# Patient Record
Sex: FEMALE | Race: WHITE | NOT HISPANIC OR LATINO | Employment: FULL TIME | URBAN - METROPOLITAN AREA
[De-identification: names, ages, dates, MRNs, and addresses within clinical notes are randomized per-mention and may not be internally consistent; named-entity substitution may affect disease eponyms.]

---

## 2022-05-19 ENCOUNTER — OFFICE VISIT (OUTPATIENT)
Dept: FAMILY MEDICINE | Facility: CLINIC | Age: 22
End: 2022-05-19
Payer: COMMERCIAL

## 2022-05-19 VITALS
BODY MASS INDEX: 25.76 KG/M2 | OXYGEN SATURATION: 99 % | HEIGHT: 62 IN | SYSTOLIC BLOOD PRESSURE: 118 MMHG | TEMPERATURE: 98.3 F | WEIGHT: 140 LBS | HEART RATE: 80 BPM | DIASTOLIC BLOOD PRESSURE: 90 MMHG | RESPIRATION RATE: 15 BRPM

## 2022-05-19 DIAGNOSIS — R00.2 PALPITATIONS: Primary | ICD-10-CM

## 2022-05-19 PROCEDURE — 3008F BODY MASS INDEX DOCD: CPT | Performed by: STUDENT IN AN ORGANIZED HEALTH CARE EDUCATION/TRAINING PROGRAM

## 2022-05-19 PROCEDURE — 99203 OFFICE O/P NEW LOW 30 MIN: CPT | Performed by: STUDENT IN AN ORGANIZED HEALTH CARE EDUCATION/TRAINING PROGRAM

## 2022-05-19 RX ORDER — DROSPIRENONE AND ETHINYL ESTRADIOL 0.02-3(28)
KIT ORAL
COMMUNITY
Start: 2022-05-16 | End: 2023-03-24 | Stop reason: ALTCHOICE

## 2022-05-19 NOTE — PROGRESS NOTES
"Subjective      Patient ID: Taran Jernigan is a 21 y.o. female.  2000      HPI     Patient presents for palpitations.  Happens while lying down at times.  Happened on most days last week and then a few days ago, but not since then.  Each episode is 5-10 seconds. Asymptomatic when working out.  No chest pain or shortness of breath during episodes.  Just starting summer semester, but does not feel overall stressed otherwise.  HR was initially higher than usual when episodes started last week, but has normalized this week.  Has cut out coffee and alcohol for the past two weeks in case that was the cause.  Used to drink coffee 3 times a week, sometimes 2 cups.  Now drinks matcha.  Has had anxiety in the past, but this feels different.  Was also having some indigestion and heart burn yesterday.  Had labs through [facility] on 5/10/22 - CBC, CMP and TSH all normal  Had abnormal TSH in 2019.    The following have been reviewed and updated as appropriate in this visit:   Allergies  Meds  Problems         Review of Systems reviewed and as noted in the HPI.    Objective     Vitals:    05/19/22 0831   BP: 118/90   BP Location: Left upper arm   Patient Position: Sitting   Pulse: 80   Resp: 15   Temp: 36.8 °C (98.3 °F)   TempSrc: Temporal   SpO2: 99%   Weight: 63.5 kg (140 lb)   Height: 1.575 m (5' 2\")     Body mass index is 25.61 kg/m².    Physical Exam  Vitals reviewed.   HENT:      Head: Normocephalic and atraumatic.   Eyes:      Extraocular Movements: Extraocular movements intact.   Cardiovascular:      Rate and Rhythm: Normal rate and regular rhythm.   Pulmonary:      Effort: Pulmonary effort is normal.      Breath sounds: Normal breath sounds.   Musculoskeletal:      Right lower leg: No edema.      Left lower leg: No edema.   Skin:     General: Skin is warm and dry.   Neurological:      General: No focal deficit present.      Mental Status: She is alert and oriented to person, place, and time.   Psychiatric:  "        Mood and Affect: Mood normal.         Assessment/Plan   Diagnoses and all orders for this visit:    Palpitations (Primary)  Resolving   Recent TSH, CBC w/diff and CMP all normal last week  Continue off coffee and alcohol as tolerated  Manage stress  Holter monitor ordered in the case that palpitations worsen again - patient can keep a symptom diary  -     Holter monitor - 24 hour; Future      Ev Metzger MD  5/19/2022

## 2022-05-23 ENCOUNTER — HOSPITAL ENCOUNTER (OUTPATIENT)
Dept: CARDIOLOGY | Facility: HOSPITAL | Age: 22
Discharge: HOME | End: 2022-05-23
Attending: STUDENT IN AN ORGANIZED HEALTH CARE EDUCATION/TRAINING PROGRAM
Payer: COMMERCIAL

## 2022-05-23 DIAGNOSIS — R00.2 PALPITATIONS: ICD-10-CM

## 2022-05-23 PROCEDURE — 93225 XTRNL ECG REC<48 HRS REC: CPT

## 2022-05-26 PROCEDURE — 93227 XTRNL ECG REC<48 HR R&I: CPT | Performed by: STUDENT IN AN ORGANIZED HEALTH CARE EDUCATION/TRAINING PROGRAM

## 2022-07-15 ENCOUNTER — OFFICE VISIT (OUTPATIENT)
Dept: FAMILY MEDICINE | Facility: CLINIC | Age: 22
End: 2022-07-15
Payer: COMMERCIAL

## 2022-07-15 VITALS
BODY MASS INDEX: 25.4 KG/M2 | HEART RATE: 84 BPM | HEIGHT: 62 IN | OXYGEN SATURATION: 99 % | WEIGHT: 138 LBS | RESPIRATION RATE: 16 BRPM | TEMPERATURE: 97.3 F

## 2022-07-15 DIAGNOSIS — F41.1 GAD (GENERALIZED ANXIETY DISORDER): Primary | ICD-10-CM

## 2022-07-15 DIAGNOSIS — K21.9 GASTROESOPHAGEAL REFLUX DISEASE WITHOUT ESOPHAGITIS: ICD-10-CM

## 2022-07-15 PROCEDURE — 99214 OFFICE O/P EST MOD 30 MIN: CPT | Performed by: STUDENT IN AN ORGANIZED HEALTH CARE EDUCATION/TRAINING PROGRAM

## 2022-07-15 PROCEDURE — 3008F BODY MASS INDEX DOCD: CPT | Performed by: STUDENT IN AN ORGANIZED HEALTH CARE EDUCATION/TRAINING PROGRAM

## 2022-07-15 NOTE — PATIENT INSTRUCTIONS
Continue lexapro  Consider a therapist.  Unisom for sleep  Passional flower and valerian root - sleep aids (melatonin-free)    Pepcid 20 mg twice daily for at least a week to see if it helps  Start a probiotic  Drink 1-2 liters of water daily and eat fiber (metamucil)  Chantal, peppermint (IBGard, peppermint oil capsule)

## 2022-07-15 NOTE — ASSESSMENT & PLAN NOTE
Associated with stress and alternating diarrhea and constipation  Pepcid 20 mg twice daily for at least a week  Start a probiotic  Drink 1-2 liters of water daily and eat fiber (metamucil)  Chantal, peppermint (IBGard, peppermint oil capsule)  [facility] diet

## 2022-07-15 NOTE — PROGRESS NOTES
"Subjective      Patient ID: Taran Jernigan is a 21 y.o. female.  2000      HPI     Patient presents for anxiety and GI upset.  Was started on lexapro by [employer] a few days ago.  No side effects.  Was on lexapro during first year of college; has been off for a few years but anxiety is returning.  Therapist: working on finding one  Sleep: working on it, melatonin made her wake up in the middle of the night    Also having heartburn and acid reflux over the past week.  Has happened in the past.  TUMS and pepcid were not helpful, although she took pepcid as needed only.  No dietary triggers, although has been avoiding spicy food and other potential triggers.  Notes some sharp transient LLQ pain.   No melena or hematochezia.  Related to stress per patient.  Has a history of self-induced vomiting in high school, worried if there is any GI sequelae.    The following have been reviewed and updated as appropriate in this visit:   Allergies  Meds  Problems         Review of Systems reviewed and as noted in the HPI.    Objective     Vitals:    07/15/22 1323   Pulse: 84   Resp: 16   Temp: 36.3 °C (97.3 °F)   TempSrc: Temporal   SpO2: 99%   Weight: 62.6 kg (138 lb)   Height: 1.562 m (5' 1.5\")     Body mass index is 25.65 kg/m².    Physical Exam  Vitals reviewed.   HENT:      Head: Normocephalic and atraumatic.   Eyes:      Extraocular Movements: Extraocular movements intact.   Cardiovascular:      Rate and Rhythm: Normal rate and regular rhythm.   Pulmonary:      Effort: Pulmonary effort is normal.      Breath sounds: Normal breath sounds.   Abdominal:      General: Bowel sounds are normal. There is no distension.      Palpations: Abdomen is soft.      Comments: Mild TTP of LLQ   Musculoskeletal:      Right lower leg: No edema.      Left lower leg: No edema.   Skin:     General: Skin is warm and dry.   Neurological:      General: No focal deficit present.      Mental Status: She is alert and oriented to " person, place, and time.   Psychiatric:         Mood and Affect: Mood normal.         Assessment/Plan   Diagnoses and all orders for this visit:    RAMANDEEP (generalized anxiety disorder) (Primary)  Assessment & Plan:  Continue lexapro - has follow up appt with prescriber in about a month  Start therapy      Gastroesophageal reflux disease without esophagitis  Assessment & Plan:  Associated with stress and alternating diarrhea and constipation  Pepcid 20 mg twice daily for at least a week  Start a probiotic  Drink 1-2 liters of water daily and eat fiber (metamucil)  Chantal, peppermint (IBGard, peppermint oil capsule)  [place] diet          Ev Metzger MD  7/15/2022

## 2022-09-02 ENCOUNTER — TRANSCRIBE ORDERS (OUTPATIENT)
Dept: SCHEDULING | Age: 22
End: 2022-09-02

## 2022-09-02 DIAGNOSIS — K63.9 DISEASE OF INTESTINE, UNSPECIFIED: Primary | ICD-10-CM

## 2022-09-02 DIAGNOSIS — K21.9 GASTRO-ESOPHAGEAL REFLUX DISEASE WITHOUT ESOPHAGITIS: ICD-10-CM

## 2022-09-14 ENCOUNTER — HOSPITAL ENCOUNTER (OUTPATIENT)
Dept: RADIOLOGY | Facility: HOSPITAL | Age: 22
Discharge: HOME | End: 2022-09-14
Payer: COMMERCIAL

## 2022-09-14 DIAGNOSIS — K21.9 GASTRO-ESOPHAGEAL REFLUX DISEASE WITHOUT ESOPHAGITIS: ICD-10-CM

## 2022-09-14 DIAGNOSIS — K63.9 DISEASE OF INTESTINE, UNSPECIFIED: ICD-10-CM

## 2022-09-14 PROCEDURE — 74246 X-RAY XM UPR GI TRC 2CNTRST: CPT

## 2023-03-24 ENCOUNTER — OFFICE VISIT (OUTPATIENT)
Dept: PRIMARY CARE | Facility: CLINIC | Age: 23
End: 2023-03-24
Payer: COMMERCIAL

## 2023-03-24 VITALS
BODY MASS INDEX: 27.6 KG/M2 | OXYGEN SATURATION: 99 % | RESPIRATION RATE: 15 BRPM | HEIGHT: 62 IN | DIASTOLIC BLOOD PRESSURE: 74 MMHG | HEART RATE: 92 BPM | WEIGHT: 150 LBS | TEMPERATURE: 97.7 F | SYSTOLIC BLOOD PRESSURE: 120 MMHG

## 2023-03-24 DIAGNOSIS — E66.3 OVERWEIGHT: ICD-10-CM

## 2023-03-24 DIAGNOSIS — Z00.00 ENCOUNTER FOR PREVENTIVE CARE: Primary | ICD-10-CM

## 2023-03-24 DIAGNOSIS — L30.9 DERMATITIS: ICD-10-CM

## 2023-03-24 PROCEDURE — 99385 PREV VISIT NEW AGE 18-39: CPT | Performed by: INTERNAL MEDICINE

## 2023-03-24 PROCEDURE — 3008F BODY MASS INDEX DOCD: CPT | Performed by: INTERNAL MEDICINE

## 2023-03-24 RX ORDER — MOMETASONE FUROATE 1 MG/G
CREAM TOPICAL DAILY
Qty: 45 G | Refills: 1 | Status: SHIPPED | OUTPATIENT
Start: 2023-03-24 | End: 2023-05-15

## 2023-03-24 ASSESSMENT — PAIN SCALES - GENERAL: PAINLEVEL: 0-NO PAIN

## 2023-03-24 ASSESSMENT — PATIENT HEALTH QUESTIONNAIRE - PHQ9: SUM OF ALL RESPONSES TO PHQ9 QUESTIONS 1 & 2: 0

## 2023-03-24 NOTE — PROGRESS NOTES
"    Subjective      Patient ID: Taran Jernigan is a 22 y.o. female.  2000      New pt visit 22 y old     Pt would like to get updated labs.    BMI 27.8 diet and exercise doing ok.  Hard to fit in exercise time.     GYN: Sees Dr Patton seen around the summer believes she had  PAP smear that was normal.  Recently stopped OCP to consider IUD.  Had regular menses on OCP no hx of normal PAP smear      Immunization status:  tdap up to date unaware of dates.  COVID 19 up to date unaware of dates   Flu up to date unaware of dates   HPV completed series   Completed hep b series       Has eczematous rash on hands otc meds has not helped       The following have been reviewed and updated as appropriate in this visit:   Tobacco  Allergies  Meds  Problems  Med Hx  Surg Hx  Fam Hx  Soc   Hx      Review of Systems   Skin: Positive for rash.   All other systems reviewed and are negative.      Objective     Vitals:    03/24/23 1421   BP: 120/74   BP Location: Right upper arm   Patient Position: Sitting   Pulse: 92   Resp: 15   Temp: 36.5 °C (97.7 °F)   TempSrc: Temporal   SpO2: 99%   Weight: 68 kg (150 lb)   Height: 1.562 m (5' 1.5\")     Body mass index is 27.88 kg/m².    Physical Exam  Vitals reviewed.   Constitutional:       Appearance: Normal appearance. She is well-developed.   HENT:      Head: Normocephalic and atraumatic.      Right Ear: Tympanic membrane, ear canal and external ear normal.      Left Ear: Tympanic membrane, ear canal and external ear normal.      Nose: Nose normal.      Mouth/Throat:      Mouth: Mucous membranes are moist.      Pharynx: Oropharynx is clear.   Cardiovascular:      Rate and Rhythm: Normal rate and regular rhythm.      Heart sounds: Normal heart sounds.   Pulmonary:      Effort: Pulmonary effort is normal.      Breath sounds: Normal breath sounds.   Abdominal:      General: Abdomen is flat. Bowel sounds are normal.      Palpations: Abdomen is soft.   Musculoskeletal:         General: " No swelling.      Cervical back: Normal range of motion and neck supple.   Skin:     General: Skin is warm and dry.   Neurological:      General: No focal deficit present.      Mental Status: She is alert and oriented to person, place, and time.   Psychiatric:         Mood and Affect: Mood normal.         Behavior: Behavior normal.         Thought Content: Thought content normal.         Judgment: Judgment normal.         Assessment/Plan   Diagnoses and all orders for this visit:    Encounter for preventive care (Primary)  -     CBC; Future  -     Comprehensive metabolic panel; Future  -     Lipid panel; Future  -     Urinalysis with microscopic; Future  Up to date with vaccines get dates   Up to date with GYN get records     Overweight  -     Comprehensive metabolic panel; Future  -     Lipid panel; Future  -     Hemoglobin A1c; Future  -     TSH w reflex FT4; Future    Dematitis: Elocon nightly   Liberalize moisturizer

## 2023-04-25 PROBLEM — E78.00 PURE HYPERCHOLESTEROLEMIA: Status: ACTIVE | Noted: 2023-04-25

## 2023-04-25 PROBLEM — R79.89 ABNORMAL SERUM THYROID STIMULATING HORMONE (TSH) LEVEL: Status: ACTIVE | Noted: 2023-04-25

## 2023-04-25 LAB
ALBUMIN SERPL-MCNC: 4.7 G/DL (ref 3.6–5.1)
ALBUMIN/GLOB SERPL: 1.7 (CALC) (ref 1–2.5)
ALP SERPL-CCNC: 78 U/L (ref 31–125)
ALT SERPL-CCNC: 24 U/L (ref 6–29)
APPEARANCE UR: CLEAR
AST SERPL-CCNC: 23 U/L (ref 10–30)
BACTERIA #/AREA URNS HPF: ABNORMAL /HPF
BILIRUB SERPL-MCNC: 0.4 MG/DL (ref 0.2–1.2)
BILIRUB UR QL STRIP: NEGATIVE
BUN SERPL-MCNC: 9 MG/DL (ref 7–25)
BUN/CREAT SERPL: NORMAL (CALC) (ref 6–22)
CALCIUM SERPL-MCNC: 9.9 MG/DL (ref 8.6–10.2)
CHLORIDE SERPL-SCNC: 102 MMOL/L (ref 98–110)
CHOLEST SERPL-MCNC: 260 MG/DL
CHOLEST/HDLC SERPL: 2.7 (CALC)
CO2 SERPL-SCNC: 26 MMOL/L (ref 20–32)
COLOR UR: YELLOW
CREAT SERPL-MCNC: 0.64 MG/DL (ref 0.5–0.96)
EGFRCR SERPLBLD CKD-EPI 2021: 126 ML/MIN/1.73M2
ERYTHROCYTE [DISTWIDTH] IN BLOOD BY AUTOMATED COUNT: 11.7 % (ref 11–15)
GLOBULIN SER CALC-MCNC: 2.8 G/DL (CALC) (ref 1.9–3.7)
GLUCOSE SERPL-MCNC: 79 MG/DL (ref 65–99)
GLUCOSE UR QL STRIP: NEGATIVE
HBA1C MFR BLD: 4.5 % OF TOTAL HGB
HCT VFR BLD AUTO: 45.3 % (ref 35–45)
HDLC SERPL-MCNC: 97 MG/DL
HGB BLD-MCNC: 15.4 G/DL (ref 11.7–15.5)
HGB UR QL STRIP: NEGATIVE
HYALINE CASTS #/AREA URNS LPF: ABNORMAL /LPF
KETONES UR QL STRIP: NEGATIVE
LDLC SERPL CALC-MCNC: 135 MG/DL (CALC)
LEUKOCYTE ESTERASE UR QL STRIP: ABNORMAL
MCH RBC QN AUTO: 32 PG (ref 27–33)
MCHC RBC AUTO-ENTMCNC: 34 G/DL (ref 32–36)
MCV RBC AUTO: 94.2 FL (ref 80–100)
NITRITE UR QL STRIP: NEGATIVE
NONHDLC SERPL-MCNC: 163 MG/DL (CALC)
PH UR STRIP: 5.5 [PH] (ref 5–8)
PLATELET # BLD AUTO: 306 THOUSAND/UL (ref 140–400)
PMV BLD REES-ECKER: 10.3 FL (ref 7.5–12.5)
POTASSIUM SERPL-SCNC: 4.3 MMOL/L (ref 3.5–5.3)
PROT SERPL-MCNC: 7.5 G/DL (ref 6.1–8.1)
PROT UR QL STRIP: NEGATIVE
RBC # BLD AUTO: 4.81 MILLION/UL (ref 3.8–5.1)
RBC #/AREA URNS HPF: ABNORMAL /HPF
SERVICE CMNT-IMP: ABNORMAL
SODIUM SERPL-SCNC: 138 MMOL/L (ref 135–146)
SP GR UR STRIP: 1.01 (ref 1–1.03)
SQUAMOUS #/AREA URNS HPF: ABNORMAL /HPF
T4 FREE SERPL-MCNC: 0.9 NG/DL (ref 0.8–1.8)
TRIGL SERPL-MCNC: 149 MG/DL
TSH SERPL-ACNC: 4.89 MIU/L
WBC # BLD AUTO: 5.8 THOUSAND/UL (ref 3.8–10.8)
WBC #/AREA URNS HPF: ABNORMAL /HPF

## 2023-04-26 ENCOUNTER — TELEPHONE (OUTPATIENT)
Dept: PRIMARY CARE | Facility: CLINIC | Age: 23
End: 2023-04-26
Payer: COMMERCIAL

## 2023-04-26 DIAGNOSIS — R79.89 ABNORMAL SERUM THYROID STIMULATING HORMONE (TSH) LEVEL: Primary | ICD-10-CM

## 2023-04-26 NOTE — TELEPHONE ENCOUNTER
Regarding: Lab Work Results  Contact: [phone]  ----- Message from Sheri Graves sent at 4/26/2023  7:53 AM EDT -----       ----- Message from Taran Jernigan to Robina Mitchell DO sent at 4/25/2023  5:50 PM -----   Hi Dr. Mitchell,  Thank you for your comments on my test results. I was looking through previous bloodwork records from [facility] and found the following results for TSH:   Dec 2017 - 6.62  Jan 2019 - 5.13  May 2022 - 3.35  Current - 4.89  Is there any additional testing we should pursue? Or how often should follow up with testing thyroid function? Hypothyroidism/Hashimotos runs in my family (maternal aunts/grandmother). I was surprised by cholesterol levels, but will be more mindful of diet and exercise. Thank you.  Taran Bruce

## 2023-04-26 NOTE — TELEPHONE ENCOUNTER
Spoke with pt- has family history of cholesterol issues and some thyroid disease.  Would like to see Endo- referral placed to Dr Kong.  Heart healthy lifestyle assess lipids yearly

## 2023-05-15 ENCOUNTER — OFFICE VISIT (OUTPATIENT)
Dept: ENDOCRINOLOGY | Facility: CLINIC | Age: 23
End: 2023-05-15
Payer: COMMERCIAL

## 2023-05-15 VITALS
SYSTOLIC BLOOD PRESSURE: 120 MMHG | DIASTOLIC BLOOD PRESSURE: 68 MMHG | WEIGHT: 152 LBS | OXYGEN SATURATION: 98 % | RESPIRATION RATE: 16 BRPM | BODY MASS INDEX: 27.97 KG/M2 | HEART RATE: 86 BPM | HEIGHT: 62 IN

## 2023-05-15 DIAGNOSIS — E03.8 SUBCLINICAL HYPOTHYROIDISM: Primary | ICD-10-CM

## 2023-05-15 PROCEDURE — 3008F BODY MASS INDEX DOCD: CPT | Performed by: INTERNAL MEDICINE

## 2023-05-15 PROCEDURE — 99204 OFFICE O/P NEW MOD 45 MIN: CPT | Performed by: INTERNAL MEDICINE

## 2023-05-15 RX ORDER — LEVOTHYROXINE SODIUM 25 UG/1
25 TABLET ORAL DAILY
Qty: 90 TABLET | Refills: 0 | Status: SHIPPED | OUTPATIENT
Start: 2023-05-15 | End: 2023-08-08 | Stop reason: SDUPTHER

## 2023-05-15 NOTE — PROGRESS NOTES
HPI  22 y.o. female with no significant PMH presenting for evaluation and management of abnormal TSH  Referred by: PCP Robina Mitchell DO     Recent history summarized as per review of records below:  - Established care with PCP Robina Mitchell DO 3/24/23. TSH ordered due to overweight  - 4/25 pt noted she had had elevated TSH levels looking back at her prior labs 12/2017 TSH 6.62, 1/2019 TSH 5.13, 5/2022 3.35, 4/2023 4.89. PCP referred to endocrinology at pt request.     Thyroid history per patient:  - Pt states psychologist in freshman year of college had it checked and has been slightly off since then. Has always been recommended to continue monitoring. Never recommended to take medication for thyroid. Wants to have it rechecked now since established care with new PCP and bc feels low on energy.     Fatigue: yes  Change in weight: In grad school for past 2 years and has since gained ~10-15 lb. Has been exercising but less than she used to now in grad school. No change in eating habits  Wt Readings from Last 3 Encounters:   05/15/23 68.9 kg (152 lb)   03/24/23 68 kg (150 lb)   07/15/22 62.6 kg (138 lb)   Heat/cold intolerance: + cold intolerance, ongoing for long time  Palpitations: was having some ~1 year ago, but thought it could be anxiety and then stopped focusing on it and now only has occasionally  Sometimes normal stools, sometimes looser stools. In past 1-2 years has had more stomach issues with reflux  Anxiety: yes and depression, ongoing for long time  Changes in skin/hair/nails: + hair thinning for past 2 years  Sleep disturbances/insomnia: wakes up few times in the night and doesn't feel rested    Menstrual history: regular menses. Just stopped Vestura OCP 3/2023 bc didn't want to take any more  Pregnancy history: never  Sexually active: yes, using contraception    Dysphagia/odynophagia: denies  Change in neck size/new neck masses: denies    Personal history of head/neck radiation: denies  Family  history of thyroid disease or cancer: + maternal aunt and GM - hypothyroidism  Family history of autoimmunity: mother - psoriasis    Taking biotin: no    Was studying speech therapy in grad school, just graduated    -------------------------------------------------------------------------  [facility]  Past Medical History:   Diagnosis Date   • RAMANDEEP (generalized anxiety disorder) 7/15/2022   • Gastroesophageal reflux disease without esophagitis 7/15/2022     [facility]  History reviewed. No pertinent surgical history.  Social  Social History     Socioeconomic History   • Marital status: Significant Other     Spouse name: Not on file   • Number of children: Not on file   • Years of education: Not on file   • Highest education level: Not on file   Occupational History   • Not on file   Tobacco Use   • Smoking status: Never   • Smokeless tobacco: Never   Vaping Use   • Vaping status: Never Used     Passive vaping exposure: Yes   Substance and Sexual Activity   • Alcohol use: Yes     Alcohol/week: 2.0 standard drinks of alcohol     Types: 2 Standard drinks or equivalent per week     Comment: Social   • Drug use: Never   • Sexual activity: Defer   Other Topics Concern   • Not on file   Social History Narrative    Single no kids female     Studying Speech Pathology      Social Determinants of Health     Financial Resource Strain: Not on file   Food Insecurity: Not on file   Transportation Needs: Not on file   Physical Activity: Not on file   Stress: Not on file   Social Connections: Not on file   Intimate Partner Violence: Not on file   Housing Stability: Not on file     Family hx  Family History   Problem Relation Age of Onset   • Hypertension Biological Mother    • No Known Problems Biological Father    • Heart disease Maternal Grandmother    • Heart disease Maternal Grandfather    • Heart disease Paternal Grandfather      Medications    Current Outpatient Medications:   •  mometasone (ELOCON) 0.1 % cream, Apply topically daily.  "(Patient not taking: Reported on 5/15/2023), Disp: 45 g, Rfl: 1   Allergies  No Known Allergies  -------------------------------------------------------------------------  ROS: All other 10 point systems reviewed and negative except as mentioned in HPI    PHYSICAL EXAM  Visit Vitals  /68 (BP Location: Right upper arm, Patient Position: Sitting)   Pulse 86   Resp 16   Ht 1.575 m (5' 2\")   Wt 68.9 kg (152 lb)   SpO2 98%   BMI 27.80 kg/m²     Wt Readings from Last 3 Encounters:   05/15/23 68.9 kg (152 lb)   03/24/23 68 kg (150 lb)   07/15/22 62.6 kg (138 lb)       Gen: well nourished, no acute distress  Eyes: no proptosis, normal conjunctiva  Neck: no thyromegaly, no nodules palpated  CV: regular rate and rhythm  Pulm: no use of accessory muscles, on room air  Abd: non distended  Neuro: AAOx3  MSK: steady gait, no tremor of outstretched hands  Psych: normal mood, affect    LABS REVIEWED  Lab Results   Component Value Date    TSH 4.89 (H) 04/24/2023    T4F 0.9 04/24/2023     ASSESSMENT AND PLAN    1. Subclinical hypothyroidism  - Ongoing since at least 2017 per pt reported TSH values, no free T4 levels available. Never treated in past but pt c/o fatigue, unexplained weight gain, cold intolerance, intermittent constipation, anxiety/depression, hair thinning. D/w pt that this could be due to underlying thyroid disorder but symptoms are nonspecific. Willing to try LT4 and see if this leads to improvement in symptoms  - Start levothyroxine 25 mcg daily. Counseled on proper administration. If symptoms do not improve despite normalization of TFTs then likely not due to thhyroid  - Counseled on signs/symptoms of hypo/hyperthyroidism and instructed pt to call with concerns  - Counseled pt on importance of thyroid hormone with regards to pregnancy  - Check TFTs, TPO prior to next visit for monitoring. Instructed pt to hold biotin for at least 3 days prior to lab draw     Letter sent to referring provider  RTC 3 mo  "

## 2023-08-09 LAB
T4 FREE SERPL-MCNC: 1 NG/DL (ref 0.8–1.8)
THYROPEROXIDASE AB SERPL-ACNC: 8 IU/ML
TSH SERPL-ACNC: 3.42 MIU/L

## 2023-08-15 ENCOUNTER — OFFICE VISIT (OUTPATIENT)
Dept: ENDOCRINOLOGY | Facility: CLINIC | Age: 23
End: 2023-08-15
Payer: COMMERCIAL

## 2023-08-15 VITALS
BODY MASS INDEX: 28.93 KG/M2 | DIASTOLIC BLOOD PRESSURE: 72 MMHG | SYSTOLIC BLOOD PRESSURE: 120 MMHG | HEIGHT: 62 IN | OXYGEN SATURATION: 98 % | HEART RATE: 91 BPM | WEIGHT: 157.2 LBS

## 2023-08-15 DIAGNOSIS — E03.8 SUBCLINICAL HYPOTHYROIDISM: Primary | ICD-10-CM

## 2023-08-15 PROBLEM — R79.89 ABNORMAL SERUM THYROID STIMULATING HORMONE (TSH) LEVEL: Status: RESOLVED | Noted: 2023-04-25 | Resolved: 2023-08-15

## 2023-08-15 PROCEDURE — 3008F BODY MASS INDEX DOCD: CPT | Performed by: INTERNAL MEDICINE

## 2023-08-15 PROCEDURE — 99214 OFFICE O/P EST MOD 30 MIN: CPT | Performed by: INTERNAL MEDICINE

## 2023-08-15 RX ORDER — LEVOTHYROXINE SODIUM 50 UG/1
50 TABLET ORAL DAILY
Qty: 90 TABLET | Refills: 1 | Status: SHIPPED | OUTPATIENT
Start: 2023-08-15 | End: 2023-11-27 | Stop reason: SDUPTHER

## 2023-11-27 RX ORDER — LEVOTHYROXINE SODIUM 50 UG/1
50 TABLET ORAL DAILY
Qty: 30 TABLET | Refills: 0 | Status: SHIPPED | OUTPATIENT
Start: 2023-11-27 | End: 2024-01-23 | Stop reason: SDUPTHER

## 2023-11-29 LAB
T4 FREE SERPL-MCNC: 1.2 NG/DL (ref 0.8–1.8)
TSH SERPL-ACNC: 2.34 MIU/L

## 2023-11-30 ENCOUNTER — OFFICE VISIT (OUTPATIENT)
Dept: ENDOCRINOLOGY | Facility: CLINIC | Age: 23
End: 2023-11-30
Payer: COMMERCIAL

## 2023-11-30 VITALS
RESPIRATION RATE: 18 BRPM | DIASTOLIC BLOOD PRESSURE: 70 MMHG | SYSTOLIC BLOOD PRESSURE: 118 MMHG | HEART RATE: 93 BPM | WEIGHT: 154 LBS | OXYGEN SATURATION: 100 % | BODY MASS INDEX: 28.34 KG/M2 | HEIGHT: 62 IN

## 2023-11-30 DIAGNOSIS — E03.8 SUBCLINICAL HYPOTHYROIDISM: Primary | ICD-10-CM

## 2023-11-30 PROCEDURE — 99214 OFFICE O/P EST MOD 30 MIN: CPT | Performed by: INTERNAL MEDICINE

## 2023-11-30 PROCEDURE — 3008F BODY MASS INDEX DOCD: CPT | Performed by: INTERNAL MEDICINE

## 2023-11-30 RX ORDER — LIOTHYRONINE SODIUM 5 UG/1
2.5 TABLET ORAL DAILY
Qty: 45 TABLET | Refills: 0 | Status: SHIPPED | OUTPATIENT
Start: 2023-11-30 | End: 2024-01-23

## 2023-12-15 ENCOUNTER — TRANSCRIBE ORDERS (OUTPATIENT)
Dept: SCHEDULING | Age: 23
End: 2023-12-15

## 2023-12-15 DIAGNOSIS — N93.9 ABNORMAL UTERINE AND VAGINAL BLEEDING, UNSPECIFIED: Primary | ICD-10-CM

## 2023-12-22 ENCOUNTER — HOSPITAL ENCOUNTER (OUTPATIENT)
Dept: RADIOLOGY | Facility: HOSPITAL | Age: 23
Discharge: HOME | End: 2023-12-22
Attending: OBSTETRICS & GYNECOLOGY
Payer: COMMERCIAL

## 2023-12-22 DIAGNOSIS — N93.9 ABNORMAL UTERINE AND VAGINAL BLEEDING, UNSPECIFIED: ICD-10-CM

## 2023-12-22 PROCEDURE — 76830 TRANSVAGINAL US NON-OB: CPT

## 2023-12-22 PROCEDURE — 76856 US EXAM PELVIC COMPLETE: CPT

## 2024-01-02 ENCOUNTER — APPOINTMENT (RX ONLY)
Dept: URBAN - METROPOLITAN AREA CLINIC 28 | Facility: CLINIC | Age: 24
Setting detail: DERMATOLOGY
End: 2024-01-02

## 2024-01-02 DIAGNOSIS — L65.9 NONSCARRING HAIR LOSS, UNSPECIFIED: ICD-10-CM | Status: INADEQUATELY CONTROLLED

## 2024-01-02 DIAGNOSIS — L70.0 ACNE VULGARIS: ICD-10-CM

## 2024-01-02 DIAGNOSIS — L21.8 OTHER SEBORRHEIC DERMATITIS: ICD-10-CM | Status: INADEQUATELY CONTROLLED

## 2024-01-02 PROCEDURE — ? PRESCRIPTION

## 2024-01-02 PROCEDURE — ? ADDITIONAL NOTES

## 2024-01-02 PROCEDURE — ? COUNSELING

## 2024-01-02 PROCEDURE — ? ORDER TESTS

## 2024-01-02 PROCEDURE — 99204 OFFICE O/P NEW MOD 45 MIN: CPT

## 2024-01-02 PROCEDURE — ? PRESCRIPTION MEDICATION MANAGEMENT

## 2024-01-02 RX ORDER — KETOCONAZOLE 20 MG/ML
1 SHAMPOO, SUSPENSION TOPICAL BID
Qty: 120 | Refills: 6 | Status: ERX | COMMUNITY
Start: 2024-01-02

## 2024-01-02 RX ORDER — ADAPALENE 3 MG/G
1 GEL TOPICAL QHS
Qty: 45 | Refills: 3 | Status: CANCELLED | COMMUNITY
Start: 2024-01-02

## 2024-01-02 RX ORDER — DAPSONE 75 MG/G
1 GEL TOPICAL QHS
Qty: 90 | Refills: 3 | Status: CANCELLED | COMMUNITY
Start: 2024-01-02

## 2024-01-02 RX ADMIN — DAPSONE 1: 75 GEL TOPICAL at 00:00

## 2024-01-02 RX ADMIN — ADAPALENE 1: 3 GEL TOPICAL at 00:00

## 2024-01-02 RX ADMIN — KETOCONAZOLE 1: 20 SHAMPOO, SUSPENSION TOPICAL at 00:00

## 2024-01-02 ASSESSMENT — LOCATION SIMPLE DESCRIPTION DERM
LOCATION SIMPLE: SCALP
LOCATION SIMPLE: LEFT CHEEK

## 2024-01-02 ASSESSMENT — LOCATION DETAILED DESCRIPTION DERM
LOCATION DETAILED: RIGHT CENTRAL PARIETAL SCALP
LOCATION DETAILED: LEFT SUPERIOR PARIETAL SCALP
LOCATION DETAILED: LEFT INFERIOR CENTRAL MALAR CHEEK

## 2024-01-02 ASSESSMENT — LOCATION ZONE DERM
LOCATION ZONE: SCALP
LOCATION ZONE: FACE

## 2024-01-02 NOTE — PROCEDURE: PRESCRIPTION MEDICATION MANAGEMENT
Detail Level: Simple
Initiate Treatment: ketoconazole 2 % shampoo: apply to wet scalp every shower, lather and leave on 5-10 minutes and rinse,  Okay to use on beard area as well
Render In Strict Bullet Format?: No
Initiate Treatment: dapsone 7.5 % topical gel with pump: Apply a thin layer to face QHS\\nadapalene 0.1 % topical gel: Apply pea sized amount to face QHS

## 2024-01-02 NOTE — PROCEDURE: ORDER TESTS
Performing Laboratory: 0
Bill For Surgical Tray: no
Billing Type: Third-Party Bill
Expected Date Of Service: 01/02/2024

## 2024-01-02 NOTE — PROCEDURE: COUNSELING
Minoxidil 5% Topical Foam Recommendations: Twice daily application for males and females is permissible.  Do not let medicine drip onto the forehead or unwanted hair may grow. The solution is also acceptable. If irritating, use less frequently.  It will take a full 12 months to see efficacy, and an initial shedding is expected to push the old hair out and bring the new thicker one in.
Detail Level: Zone
Sunscreen Recommendations: Elta MD uv clear, LRP tinted mineral or clear skin
Dapsone Counseling: I discussed with the patient the risks of dapsone including but not limited to hemolytic anemia, agranulocytosis, rashes, methemoglobinemia, kidney failure, peripheral neuropathy, headaches, GI upset, and liver toxicity.  Patients who start dapsone require monitoring including baseline LFTs and weekly CBCs for the first month, then every month thereafter.  The patient verbalized understanding of the proper use and possible adverse effects of dapsone.  All of the patient's questions and concerns were addressed.
Isotretinoin Pregnancy And Lactation Text: This medication is Pregnancy Category X and is considered extremely dangerous during pregnancy. It is unknown if it is excreted in breast milk.
Benzoyl Peroxide Pregnancy And Lactation Text: This medication is Pregnancy Category C. It is unknown if benzoyl peroxide is excreted in breast milk.
Tetracycline Counseling: Patient counseled regarding possible photosensitivity and increased risk for sunburn.  Patient instructed to avoid sunlight, if possible.  When exposed to sunlight, patients should wear protective clothing, sunglasses, and sunscreen.  The patient was instructed to call the office immediately if the following severe adverse effects occur:  hearing changes, easy bruising/bleeding, severe headache, or vision changes.  The patient verbalized understanding of the proper use and possible adverse effects of tetracycline.  All of the patient's questions and concerns were addressed. Patient understands to avoid pregnancy while on therapy due to potential birth defects.
Doxycycline Counseling:  Patient counseled regarding possible photosensitivity and increased risk for sunburn.  Patient instructed to avoid sunlight, if possible.  When exposed to sunlight, patients should wear protective clothing, sunglasses, and sunscreen.  The patient was instructed to call the office immediately if the following severe adverse effects occur:  hearing changes, easy bruising/bleeding, severe headache, or vision changes.  The patient verbalized understanding of the proper use and possible adverse effects of doxycycline.  All of the patient's questions and concerns were addressed.
Aklief counseling:  Patient advised to apply a pea-sized amount only at bedtime and wait 30 minutes after washing their face before applying.  If too drying, patient may add a non-comedogenic moisturizer.  The most commonly reported side effects including irritation, redness, scaling, dryness, stinging, burning, itching, and increased risk of sunburn.  The patient verbalized understanding of the proper use and possible adverse effects of retinoids.  All of the patient's questions and concerns were addressed.
Azithromycin Pregnancy And Lactation Text: This medication is considered safe during pregnancy and is also secreted in breast milk.
Minocycline Pregnancy And Lactation Text: This medication is Pregnancy Category D and not consider safe during pregnancy. It is also excreted in breast milk.
High Dose Vitamin A Pregnancy And Lactation Text: High dose vitamin A therapy is contraindicated during pregnancy and breast feeding.
Topical Retinoid Pregnancy And Lactation Text: This medication is Pregnancy Category C. It is unknown if this medication is excreted in breast milk.
Winlevi Counseling:  I discussed with the patient the risks of topical clascoterone including but not limited to erythema, scaling, itching, and stinging. Patient voiced their understanding.
Bactrim Counseling:  I discussed with the patient the risks of sulfa antibiotics including but not limited to GI upset, allergic reaction, drug rash, diarrhea, dizziness, photosensitivity, and yeast infections.  Rarely, more serious reactions can occur including but not limited to aplastic anemia, agranulocytosis, methemoglobinemia, blood dyscrasias, liver or kidney failure, lung infiltrates or desquamative/blistering drug rashes.
Doxycycline Pregnancy And Lactation Text: This medication is Pregnancy Category D and not consider safe during pregnancy. It is also excreted in breast milk but is considered safe for shorter treatment courses.
Sarecycline Counseling: Patient advised regarding possible photosensitivity and discoloration of the teeth, skin, lips, tongue and gums.  Patient instructed to avoid sunlight, if possible.  When exposed to sunlight, patients should wear protective clothing, sunglasses, and sunscreen.  The patient was instructed to call the office immediately if the following severe adverse effects occur:  hearing changes, easy bruising/bleeding, severe headache, or vision changes.  The patient verbalized understanding of the proper use and possible adverse effects of sarecycline.  All of the patient's questions and concerns were addressed.
Topical Clindamycin Counseling: Patient counseled that this medication may cause skin irritation or allergic reactions.  In the event of skin irritation, the patient was advised to reduce the amount of the drug applied or use it less frequently.   The patient verbalized understanding of the proper use and possible adverse effects of clindamycin.  All of the patient's questions and concerns were addressed.
Cleanser Recommendations: Neutrogena ultra gentle
Aklief Pregnancy And Lactation Text: It is unknown if this medication is safe to use during pregnancy.  It is unknown if this medication is excreted in breast milk.  Breastfeeding women should use the topical cream on the smallest area of the skin for the shortest time needed while breastfeeding.  Do not apply to nipple and areola.
Birth Control Pills Counseling: Birth Control Pill Counseling: I discussed with the patient the potential side effects of OCPs including but not limited to increased risk of stroke, heart attack, thrombophlebitis, deep venous thrombosis, hepatic adenomas, breast changes, GI upset, headaches, and depression.  The patient verbalized understanding of the proper use and possible adverse effects of OCPs. All of the patient's questions and concerns were addressed.
Spironolactone Counseling: Patient advised regarding risks of diarrhea, abdominal pain, hyperkalemia, birth defects (for female patients), liver toxicity and renal toxicity. The patient may need blood work to monitor liver and kidney function and potassium levels while on therapy. The patient verbalized understanding of the proper use and possible adverse effects of spironolactone.  All of the patient's questions and concerns were addressed.
Bpo Recommendations: Cerave acne cleanser
Topical Sulfur Applications Counseling: Topical Sulfur Counseling: Patient counseled that this medication may cause skin irritation or allergic reactions.  In the event of skin irritation, the patient was advised to reduce the amount of the drug applied or use it less frequently.   The patient verbalized understanding of the proper use and possible adverse effects of topical sulfur application.  All of the patient's questions and concerns were addressed.
Erythromycin Pregnancy And Lactation Text: This medication is Pregnancy Category B and is considered safe during pregnancy. It is also excreted in breast milk.
Azelaic Acid Pregnancy And Lactation Text: This medication is considered safe during pregnancy and breast feeding.
Winlevi Pregnancy And Lactation Text: This medication is considered safe during pregnancy and breastfeeding.
Topical Retinoid counseling:  Patient advised to apply a pea-sized amount only at bedtime and wait 30 minutes after washing their face before applying.  If too drying, patient may add a non-comedogenic moisturizer. The patient verbalized understanding of the proper use and possible adverse effects of retinoids.  All of the patient's questions and concerns were addressed.
Topical Sulfur Applications Pregnancy And Lactation Text: This medication is Pregnancy Category C and has an unknown safety profile during pregnancy. It is unknown if this topical medication is excreted in breast milk.
High Dose Vitamin A Counseling: Side effects reviewed, pt to contact office should one occur.
Use Enhanced Medication Counseling?: No
Minocycline Counseling: Patient advised regarding possible photosensitivity and discoloration of the teeth, skin, lips, tongue and gums.  Patient instructed to avoid sunlight, if possible.  When exposed to sunlight, patients should wear protective clothing, sunglasses, and sunscreen.  The patient was instructed to call the office immediately if the following severe adverse effects occur:  hearing changes, easy bruising/bleeding, severe headache, or vision changes.  The patient verbalized understanding of the proper use and possible adverse effects of minocycline.  All of the patient's questions and concerns were addressed.
Tazorac Counseling:  Patient advised that medication is irritating and drying.  Patient may need to apply sparingly and wash off after an hour before eventually leaving it on overnight.  The patient verbalized understanding of the proper use and possible adverse effects of tazorac.  All of the patient's questions and concerns were addressed.
Azithromycin Counseling:  I discussed with the patient the risks of azithromycin including but not limited to GI upset, allergic reaction, drug rash, diarrhea, and yeast infections.
Dapsone Pregnancy And Lactation Text: This medication is Pregnancy Category C and is not considered safe during pregnancy or breast feeding.
Topical Clindamycin Pregnancy And Lactation Text: This medication is Pregnancy Category B and is considered safe during pregnancy. It is unknown if it is excreted in breast milk.
Azelaic Acid Counseling: Patient counseled that medicine may cause skin irritation and to avoid applying near the eyes.  In the event of skin irritation, the patient was advised to reduce the amount of the drug applied or use it less frequently.   The patient verbalized understanding of the proper use and possible adverse effects of azelaic acid.  All of the patient's questions and concerns were addressed.
Erythromycin Counseling:  I discussed with the patient the risks of erythromycin including but not limited to GI upset, allergic reaction, drug rash, diarrhea, increase in liver enzymes, and yeast infections.
Tazorac Pregnancy And Lactation Text: This medication is not safe during pregnancy. It is unknown if this medication is excreted in breast milk.
Moisturizer Recommendations: Cicaplast balm, LRP double repair
Birth Control Pills Pregnancy And Lactation Text: This medication should be avoided if pregnant and for the first 30 days post-partum.
Isotretinoin Counseling: Patient should get monthly blood tests, not donate blood, not drive at night if vision affected, not share medication, and not undergo elective surgery for 6 months after tx completed. Side effects reviewed, pt to contact office should one occur.
Benzoyl Peroxide Counseling: Patient counseled that medicine may cause skin irritation and bleach clothing.  In the event of skin irritation, the patient was advised to reduce the amount of the drug applied or use it less frequently.   The patient verbalized understanding of the proper use and possible adverse effects of benzoyl peroxide.  All of the patient's questions and concerns were addressed.
Spironolactone Pregnancy And Lactation Text: This medication can cause feminization of the male fetus and should be avoided during pregnancy. The active metabolite is also found in breast milk.
Bactrim Pregnancy And Lactation Text: This medication is Pregnancy Category D and is known to cause fetal risk.  It is also excreted in breast milk.

## 2024-01-02 NOTE — PROCEDURE: ADDITIONAL NOTES
Additional Notes: Pt has had blood drawn recently by OBGYN and shows that she is high in testosterone (84). She also just got off JAIME birth control after being on for years. Discussed spironolactone and advised patient to discuss spironolactone use with OBCURTN and if ok, will move forward\\n\\npatient is seeing endocrinology for hypothyroidism and is currently stable on levothyroxine.
Detail Level: Simple
Render Risk Assessment In Note?: no

## 2024-01-04 ENCOUNTER — TELEPHONE (OUTPATIENT)
Dept: ENDOCRINOLOGY | Facility: CLINIC | Age: 24
End: 2024-01-04
Payer: COMMERCIAL

## 2024-01-04 NOTE — TELEPHONE ENCOUNTER
Pt was called msg left informing pt that her appt time has changed due to the change in providers schedule.  Please call the office if the time given does not work.  Thank you.

## 2024-01-09 ENCOUNTER — RX ONLY (OUTPATIENT)
Age: 24
Setting detail: RX ONLY
End: 2024-01-09

## 2024-01-09 RX ORDER — DAPSONE 75 MG/G
1 GEL TOPICAL QHS
Qty: 90 | Refills: 3 | Status: ERX

## 2024-01-09 RX ORDER — ADAPALENE 3 MG/G
1 GEL TOPICAL QHS
Qty: 45 | Refills: 3 | Status: ERX

## 2024-01-12 LAB
T4 FREE SERPL-MCNC: 1.1 NG/DL (ref 0.8–1.8)
TSH SERPL-ACNC: 1.82 MIU/L

## 2024-01-23 ENCOUNTER — OFFICE VISIT (OUTPATIENT)
Dept: ENDOCRINOLOGY | Facility: CLINIC | Age: 24
End: 2024-01-23
Payer: COMMERCIAL

## 2024-01-23 VITALS
WEIGHT: 154 LBS | HEART RATE: 76 BPM | OXYGEN SATURATION: 99 % | SYSTOLIC BLOOD PRESSURE: 122 MMHG | DIASTOLIC BLOOD PRESSURE: 76 MMHG | HEIGHT: 62 IN | RESPIRATION RATE: 18 BRPM | BODY MASS INDEX: 28.34 KG/M2

## 2024-01-23 DIAGNOSIS — L68.0 IDIOPATHIC HIRSUTISM: ICD-10-CM

## 2024-01-23 DIAGNOSIS — E03.8 SUBCLINICAL HYPOTHYROIDISM: Primary | ICD-10-CM

## 2024-01-23 PROCEDURE — 99214 OFFICE O/P EST MOD 30 MIN: CPT | Performed by: INTERNAL MEDICINE

## 2024-01-23 PROCEDURE — 3008F BODY MASS INDEX DOCD: CPT | Performed by: INTERNAL MEDICINE

## 2024-01-23 RX ORDER — SPIRONOLACTONE 50 MG/1
50 TABLET, FILM COATED ORAL DAILY
COMMUNITY
Start: 2024-01-19 | End: 2024-01-23 | Stop reason: SDUPTHER

## 2024-01-23 RX ORDER — SPIRONOLACTONE 50 MG/1
50 TABLET, FILM COATED ORAL 2 TIMES DAILY
Qty: 180 TABLET | Refills: 1 | Status: SHIPPED | OUTPATIENT
Start: 2024-01-23 | End: 2025-01-16

## 2024-01-23 RX ORDER — KETOCONAZOLE 20 MG/ML
SHAMPOO, SUSPENSION TOPICAL SEE ADMIN INSTRUCTIONS
COMMUNITY
Start: 2024-01-02 | End: 2024-11-25

## 2024-01-23 RX ORDER — LEVOTHYROXINE SODIUM 50 UG/1
50 TABLET ORAL DAILY
Qty: 90 TABLET | Refills: 1 | Status: SHIPPED | OUTPATIENT
Start: 2024-01-23 | End: 2024-07-22 | Stop reason: SDUPTHER

## 2024-01-23 NOTE — PROGRESS NOTES
"HPI  23 y.o. female with no significant PMH presenting for follow up of subclinical hypothyroidism  Referred by: PCP Robina Mitchell DO     Initial history (5/15/23):  - Established care with PCP Robina Mitchell DO 3/24/23. TSH ordered due to overweight BMI. 4/25/23 pt noted she had had elevated TSH levels looking back at her prior labs 12/2017 TSH 6.62, 1/2019 TSH 5.13, 5/2022 3.35, 4/2023 4.89. PCP referred to endocrinology at pt request.     First visit 5/15/23  Last visit 11/30/23. Started T3    Interval history  Denies any new symptoms/concerns    Taking levothyroxine 50 mcg daily properly: yes. On this dose since 8/2023, started LT4 5/2023  Also taking liothyronine 2.5 mcg daily since 11/30/23 -> only took for 2 weeks, stopped bc felt it wasn't making a difference  Taking biotin: no    Fatigue: states energy level has been \"fine\". Improved since last visit. Feels like this has improved with more regular eating habits and eating more protein   Change in weight: stable  Wt Readings from Last 3 Encounters:   01/23/24 69.9 kg (154 lb)   11/30/23 69.9 kg (154 lb)   08/15/23 71.3 kg (157 lb 3.2 oz)   Heat/cold intolerance: + cold intolerance improved from prior  Palpitations: denies  + constipation and diarrhea, attributes to what she eats.   Anxiety: yes and depression, ongoing for long time, without recent changes. Admits to being worried about possible PCOS diagnosis  Changes in skin/hair/nails: + hair thinning for past 2 years, worse since last visit, as below  Sleep disturbances/insomnia: improved since last visit. Also started taking magnesium at night    Menstrual history: regular menses in general but had one cycle that was 48 days between 7-9/2023, has been regular since. Cycle length has been ~35 days. Stopped Vestura OCP 3/2023 bc didn't want to take any more  Pregnancy history: never  Sexually active: yes, using contraception    Dysphagia/odynophagia: denies  Change in neck size/new neck masses: " "denies    Personal history of head/neck radiation: denies  Family history of thyroid disease or cancer: + maternal aunt and GM - hypothyroidism  Family history of autoimmunity: mother - psoriasis    Has been following with GYN for possible PCOS diagnosis. States it was a \"soft call\" on having PCOS based on her US. But notes she has high androgen symptoms, e.g. hair loss, hirsutism. Total testosterone was high but free testosterone was normal. Started spironolactone 50 mg daily few days ago. Was told to give it 3 weeks and if feeling ok then go up to 100 mg daily. Doesn't have f/u scheduled with GYN. Wants to switch lázaro prescription to me.    -------------------------------------------------------------------------  [facility]  Past Medical History:   Diagnosis Date   • RAMANDEEP (generalized anxiety disorder) 7/15/2022   • Gastroesophageal reflux disease without esophagitis 7/15/2022     [facility]  History reviewed. No pertinent surgical history.  Social  Social History     Socioeconomic History   • Marital status: Significant Other     Spouse name: Not on file   • Number of children: Not on file   • Years of education: Not on file   • Highest education level: Not on file   Occupational History   • Not on file   Tobacco Use   • Smoking status: Never   • Smokeless tobacco: Never   Vaping Use   • Vaping Use: Never used   Substance and Sexual Activity   • Alcohol use: Not Currently     Alcohol/week: 2.0 standard drinks of alcohol     Types: 2 Standard drinks or equivalent per week     Comment: Social   • Drug use: Never   • Sexual activity: Defer   Other Topics Concern   • Not on file   Social History Narrative    Single no kids female     Studying Speech Pathology      Social Determinants of Health     Financial Resource Strain: Not on file   Food Insecurity: Not on file   Transportation Needs: Not on file   Physical Activity: Not on file   Stress: Not on file   Social Connections: Not on file   Intimate Partner Violence: Not on file " "  Housing Stability: Not on file     Family hx  Family History   Problem Relation Age of Onset   • Hypertension Biological Mother    • No Known Problems Biological Father    • Heart disease Maternal Grandmother    • Heart disease Maternal Grandfather    • Heart disease Paternal Grandfather      Medications    Current Outpatient Medications:   •  ketoconazole (NIZORAL) 2 % shampoo, See admin instr., Disp: , Rfl:   •  levothyroxine (SYNTHROID) 50 mcg tablet, Take 1 tablet (50 mcg total) by mouth daily., Disp: 30 tablet, Rfl: 0  •  spironolactone (ALDACTONE) 50 mg tablet, Take 50 mg by mouth daily., Disp: , Rfl:   •  liothyronine (CYTOMEL) 5 mcg tablet, Take 0.5 tablets (2.5 mcg total) by mouth daily. (Patient not taking: Reported on 1/23/2024), Disp: 45 tablet, Rfl: 0   Allergies  No Known Allergies  -------------------------------------------------------------------------  ROS: All other 10 point systems reviewed and negative except as mentioned in HPI    PHYSICAL EXAM  Visit Vitals  /76 (BP Location: Right upper arm, Patient Position: Sitting)   Resp 18   Ht 1.575 m (5' 2\")   Wt 69.9 kg (154 lb)   BMI 28.17 kg/m²     Wt Readings from Last 3 Encounters:   01/23/24 69.9 kg (154 lb)   11/30/23 69.9 kg (154 lb)   08/15/23 71.3 kg (157 lb 3.2 oz)     Gen: well nourished, no acute distress  Eyes: no proptosis, normal conjunctiva  Neck: no thyromegaly  CV: regular rate   Pulm: no use of accessory muscles, on room air  Abd: non distended  Neuro: AAOx3  MSK: steady gait, no tremor of outstretched hands  Psych: normal mood, affect    LABS REVIEWED  Lab Results   Component Value Date    TSH 1.82 01/12/2024    TSH 2.34 11/28/2023    TSH 3.42 08/08/2023    T4F 1.1 01/12/2024    T4F 1.2 11/28/2023    T4F 1.0 08/08/2023    THYPEROX 8 08/08/2023       Lab Results   Component Value Date    GLUCOSE 79 04/24/2023    GLUCOSE NEGATIVE 04/24/2023    BUN 9 04/24/2023    CREATININE 0.64 04/24/2023    EGFR 126 04/24/2023     " 04/24/2023    K 4.3 04/24/2023     04/24/2023    CO2 26 04/24/2023    CALCIUM 9.9 04/24/2023 12/8/23 07:48  Testosterone 86 (2-45)  Free testosterone 2.2  DHEAS 131  FSH 3.4  PRL 8.3  E2 129    IMAGING  US PELVIS TRANSABDOMINAL & TRANSVAGINAL 12/22/23    Narrative  CLINICAL HISTORY: N93.9: Abnormal uterine and vaginal bleeding, unspecified  one episode of abnormal uterine bleeding.  LMP of 12/18/2023.    COMPARISON: None    COMMENT:  Technique: Transabdominal and transvaginal pelvic ultrasound was performed.    UTERUS:  Measures  2.6 cm x  3.9 cm x  7.1 cm cm. The myometrium is homogeneous.  ENDOMETRIUM: Endometrial stripe appears normal measuring about 0.3 cm in  thickness.  Tiny amounts of internal color Doppler flow may be just physiologic.  Slight prominence of the endocervical stripe also likely physiologic.  There is no evidence of a mass/polyp in the endometrium.    RIGHT OVARY:  Measures 1.5 cm  x 1.9 cm x 2.7 cm cm. Within normal limits  containing several follicles..  LEFT OVARY:  Measures 2.1 cm x 1.8 cm x 2.8 cm  cm.  Within normal limits  containing several follicles including a 1 cm exophytic follicle.    There is no hydrosalphinx.    There is no cul-de-sac fluid.    The endometrium is better seen transvaginally.    IMPRESSION:    No findings to explain the patient's abnormal episode of uterine bleeding.    I have personally reviewed the images and agree with the radiology report. The following is my interpretation:   Ovarian morphology not c/w PCOS    ASSESSMENT AND PLAN    1. Subclinical hypothyroidism  - Ongoing since at least 2017 per pt reported TSH values, no free T4 levels available prior to 4/2023. Started on LT4 5/2023   - Tried T4/T3 combination 11/2023 however pt discontinued after several weeks due no change in symptoms  - She does notice some improvement in symptoms overall since last visit  - Continue to aim for TSH  < 2.5  - Continue levothyroxine 50 mcg daily. Counseled on  proper administration.   - Counseled on signs/symptoms of hypo/hyperthyroidism and instructed pt to call with concerns  - Previously counseled pt on importance of thyroid hormone with regards to pregnancy  - Check TFTs prior to next visit for monitoring.     2. Idiopathic hirsutism  - Underwent PCOS evaluation with GYN with labs showing mildly elevated testosterone 12/2023  - Was on OCP until 3/2023, stopped due to not wanting to take it anymore. Had one longer cycle of ~48 days in summer 2023 but before and after has been regular again with ~35 day cycles  - Pelvic US 12/2023 without PCO morphology  - Only meets 1/3 [place] criteria therefore does not have PCOS, likely idiopathic hirsutism  - Was started on spironolactone 50 mg daily by GYN several days ago for hirsutism. Pt requested I take over this prescription. D/w pt this is typically a subtherapeutic dose and recommended increasing to 50 mg BID. Counseled that it can take up to 6 mo to notice improvement due to spironolactone  - Check BMP prior to next visit for monitoring    RTC 6 mo

## 2024-01-26 RX ORDER — LIOTHYRONINE SODIUM 5 UG/1
5 TABLET ORAL DAILY
Qty: 90 TABLET | Refills: 3 | OUTPATIENT
Start: 2024-01-26 | End: 2025-01-25

## 2024-01-26 NOTE — TELEPHONE ENCOUNTER
Pharmacy requesting Liothyronine prescription. Can you confirm if patient is taking?       liothyronine (CYTOMEL) 5 mcg tablet, Take 0.5 tablets (2.5 mcg total) by mouth daily. (Patient not taking: Reported on 1/23/2024), Disp: 45 tablet, Rfl: 0

## 2024-02-19 ENCOUNTER — OFFICE VISIT (OUTPATIENT)
Dept: PRIMARY CARE | Facility: CLINIC | Age: 24
End: 2024-02-19
Payer: COMMERCIAL

## 2024-02-19 VITALS
TEMPERATURE: 97.9 F | DIASTOLIC BLOOD PRESSURE: 80 MMHG | RESPIRATION RATE: 15 BRPM | OXYGEN SATURATION: 98 % | HEIGHT: 62 IN | WEIGHT: 152 LBS | SYSTOLIC BLOOD PRESSURE: 100 MMHG | BODY MASS INDEX: 27.97 KG/M2 | HEART RATE: 72 BPM

## 2024-02-19 DIAGNOSIS — L68.0 HIRSUTISM: Primary | ICD-10-CM

## 2024-02-19 DIAGNOSIS — E66.3 OVERWEIGHT: ICD-10-CM

## 2024-02-19 DIAGNOSIS — E03.8 SUBCLINICAL HYPOTHYROIDISM: ICD-10-CM

## 2024-02-19 DIAGNOSIS — Z00.00 ENCOUNTER FOR PREVENTIVE CARE: ICD-10-CM

## 2024-02-19 PROCEDURE — 3008F BODY MASS INDEX DOCD: CPT | Performed by: INTERNAL MEDICINE

## 2024-02-19 PROCEDURE — 99213 OFFICE O/P EST LOW 20 MIN: CPT | Performed by: INTERNAL MEDICINE

## 2024-02-19 ASSESSMENT — PATIENT HEALTH QUESTIONNAIRE - PHQ9: SUM OF ALL RESPONSES TO PHQ9 QUESTIONS 1 & 2: 0

## 2024-02-19 ASSESSMENT — PAIN SCALES - GENERAL: PAINLEVEL: 0-NO PAIN

## 2024-02-19 NOTE — Clinical Note
Need office notes advanced dermatology as well as GYN Dr Patton [facility]'s.  Please ask them to send all labs as well

## 2024-02-19 NOTE — PROGRESS NOTES
"    Subjective      Patient ID: Taran Jernigan is a 23 y.o. female.  2000      Pt is here due to concerns about menstrual irregularities.  Saw Endo and GYN.  Having hirsutism and longer menstrual cycles. Pt did not take meds prescribed, liiothyrinone but per pt she did not feel better.  Pt wants to have more glucose testing done.  She had spironolactone ordered however this dose was increased by endo to 50 mg bid.  Per pt GYN ordered an US was essentially normal.  Per pt her testosterone levels are elevated, and final notes of Endo were reviewed finally diagnosing idiopathic hirsutism.  Per pt her hair is thinning.  The dx of PCOS is not thought to be an issue. Pt went to dermatology for further evaluation. Pt brought labs on her phone and they appeared to be normal.  We are requesting records.  On labs ordered by Dr Patton there was elevation of testosterone to 86.       The following have been reviewed and updated as appropriate in this visit:   Tobacco  Allergies  Meds  Problems  Med Hx  Surg Hx  Fam Hx  Soc   Hx      Review of Systems   Skin:        Hair thinning on scalp, hirsutism face    All other systems reviewed and are negative.      Objective     Vitals:    02/19/24 1559   BP: 100/80   BP Location: Right upper arm   Patient Position: Sitting   Pulse: 72   Resp: 15   Temp: 36.6 °C (97.9 °F)   TempSrc: Temporal   SpO2: 98%   Weight: 68.9 kg (152 lb)   Height: 1.575 m (5' 2\")     Body mass index is 27.8 kg/m².    Physical Exam  Vitals reviewed.   Constitutional:       Appearance: Normal appearance.   Pulmonary:      Effort: Pulmonary effort is normal.      Breath sounds: Normal breath sounds.   Abdominal:      General: Abdomen is flat. Bowel sounds are normal.      Palpations: Abdomen is soft.   Musculoskeletal:         General: No swelling.      Cervical back: Normal range of motion and neck supple.   Neurological:      Mental Status: She is alert.         Assessment/Plan   Diagnoses and all " orders for this visit:    Hirsutism (Primary)  -     Insulin; Future  -     Testosterone, total and free; Future  Will recheck per pt query however not evidence right now supporting diagnosis    Get formal office notes of derm and gyn    May benefit from  OCP with menstrual irregularities      Subclinical hypothyroidism  -     TSH w reflex FT4; Future    Encounter for preventive care  -     CBC; Future  -     Comprehensive metabolic panel; Future  -     Lipid panel; Future  -     Urinalysis with microscopic; Future    Overweight  -     Comprehensive metabolic panel; Future  -     Lipid panel; Future  -     TSH w reflex FT4; Future  -     Hemoglobin A1c; Future

## 2024-04-30 PROBLEM — R79.89 ELEVATED TESTOSTERONE LEVEL IN FEMALE: Status: ACTIVE | Noted: 2024-04-30

## 2024-04-30 LAB
ALBUMIN SERPL-MCNC: 4.8 G/DL (ref 3.6–5.1)
ALBUMIN/GLOB SERPL: 2.1 (CALC) (ref 1–2.5)
ALP SERPL-CCNC: 70 U/L (ref 31–125)
ALT SERPL-CCNC: 23 U/L (ref 6–29)
APPEARANCE UR: CLEAR
AST SERPL-CCNC: 21 U/L (ref 10–30)
BACTERIA #/AREA URNS HPF: NORMAL /HPF
BILIRUB SERPL-MCNC: 0.4 MG/DL (ref 0.2–1.2)
BILIRUB UR QL STRIP: NEGATIVE
BUN SERPL-MCNC: 14 MG/DL (ref 7–25)
BUN/CREAT SERPL: NORMAL (CALC) (ref 6–22)
CALCIUM SERPL-MCNC: 9.8 MG/DL (ref 8.6–10.2)
CHLORIDE SERPL-SCNC: 103 MMOL/L (ref 98–110)
CHOLEST SERPL-MCNC: 231 MG/DL
CHOLEST/HDLC SERPL: 3.1 (CALC)
CO2 SERPL-SCNC: 27 MMOL/L (ref 20–32)
COLOR UR: YELLOW
CREAT SERPL-MCNC: 0.75 MG/DL (ref 0.5–0.96)
EGFRCR SERPLBLD CKD-EPI 2021: 113 ML/MIN/1.73M2
ERYTHROCYTE [DISTWIDTH] IN BLOOD BY AUTOMATED COUNT: 12.2 % (ref 11–15)
GLOBULIN SER CALC-MCNC: 2.3 G/DL (CALC) (ref 1.9–3.7)
GLUCOSE SERPL-MCNC: 84 MG/DL (ref 65–99)
GLUCOSE UR QL STRIP: NEGATIVE
HBA1C MFR BLD: 5 % OF TOTAL HGB
HCT VFR BLD AUTO: 43.9 % (ref 35–45)
HDLC SERPL-MCNC: 74 MG/DL
HGB BLD-MCNC: 15.3 G/DL (ref 11.7–15.5)
HGB UR QL STRIP: NEGATIVE
HYALINE CASTS #/AREA URNS LPF: NORMAL /LPF
INSULIN SERPL-ACNC: 7.2 UIU/ML
KETONES UR QL STRIP: NEGATIVE
LDLC SERPL CALC-MCNC: 141 MG/DL (CALC)
LEUKOCYTE ESTERASE UR QL STRIP: NEGATIVE
MCH RBC QN AUTO: 32.1 PG (ref 27–33)
MCHC RBC AUTO-ENTMCNC: 34.9 G/DL (ref 32–36)
MCV RBC AUTO: 92.2 FL (ref 80–100)
NITRITE UR QL STRIP: NEGATIVE
NONHDLC SERPL-MCNC: 157 MG/DL (CALC)
PH UR STRIP: 5.5 [PH] (ref 5–8)
PLATELET # BLD AUTO: 255 THOUSAND/UL (ref 140–400)
PMV BLD REES-ECKER: 10.3 FL (ref 7.5–12.5)
POTASSIUM SERPL-SCNC: 4.2 MMOL/L (ref 3.5–5.3)
PROT SERPL-MCNC: 7.1 G/DL (ref 6.1–8.1)
PROT UR QL STRIP: NEGATIVE
RBC # BLD AUTO: 4.76 MILLION/UL (ref 3.8–5.1)
RBC #/AREA URNS HPF: NORMAL /HPF
SERVICE CMNT-IMP: NORMAL
SODIUM SERPL-SCNC: 138 MMOL/L (ref 135–146)
SP GR UR STRIP: 1.02 (ref 1–1.03)
SQUAMOUS #/AREA URNS HPF: NORMAL /HPF
TESTOST FREE SERPL-MCNC: 6.1 PG/ML (ref 0.1–6.4)
TESTOST SERPL-MCNC: 52 NG/DL (ref 2–45)
TRIGL SERPL-MCNC: 70 MG/DL
TSH SERPL-ACNC: 2.91 MIU/L
WBC # BLD AUTO: 5.7 THOUSAND/UL (ref 3.8–10.8)
WBC #/AREA URNS HPF: NORMAL /HPF

## 2024-05-03 ENCOUNTER — TELEPHONE (OUTPATIENT)
Dept: PRIMARY CARE | Facility: CLINIC | Age: 24
End: 2024-05-03
Payer: COMMERCIAL

## 2024-05-03 DIAGNOSIS — R79.89 ELEVATED TESTOSTERONE LEVEL IN FEMALE: Primary | ICD-10-CM

## 2024-05-03 NOTE — TELEPHONE ENCOUNTER
----- Message from Gemma Martino RN sent at 5/3/2024  9:04 AM EDT -----  Regarding: FW: Labwork response  Contact: [phone]    ----- Message -----  From: Taran Jernigan  Sent: 5/2/2024   5:13 PM EDT  To: [facility] Clinical Support Pool  Subject: Labwork response                                 Hi Dr. Mitchell,    Yes I would be interested in a referral elsewhere for a second opinion on elevated testosterone levels.     Thanks,  Eliot

## 2024-05-14 ENCOUNTER — TELEPHONE (OUTPATIENT)
Dept: ENDOCRINOLOGY | Facility: CLINIC | Age: 24
End: 2024-05-14
Payer: COMMERCIAL

## 2024-05-14 NOTE — TELEPHONE ENCOUNTER
Dr. Neda Vieyra [phone] Called regarding pt. Pt has hair loss and asked if Spironolactone can be increased to 150-200 mg? Asked your opinion and if you could call?

## 2024-05-14 NOTE — TELEPHONE ENCOUNTER
Called Dr. Vieyra and left VM with my personal number to call me back to discuss. If she calls the office please tell her she can increase the dose if she feels it is indicated

## 2024-07-17 LAB
BUN SERPL-MCNC: 9 MG/DL (ref 7–25)
BUN/CREAT SERPL: NORMAL (CALC) (ref 6–22)
CALCIUM SERPL-MCNC: 9.7 MG/DL (ref 8.6–10.2)
CHLORIDE SERPL-SCNC: 104 MMOL/L (ref 98–110)
CO2 SERPL-SCNC: 25 MMOL/L (ref 20–32)
CREAT SERPL-MCNC: 0.67 MG/DL (ref 0.5–0.96)
EGFRCR SERPLBLD CKD-EPI 2021: 124 ML/MIN/1.73M2
GLUCOSE SERPL-MCNC: 84 MG/DL (ref 65–99)
POTASSIUM SERPL-SCNC: 4.2 MMOL/L (ref 3.5–5.3)
SODIUM SERPL-SCNC: 137 MMOL/L (ref 135–146)

## 2024-07-22 ENCOUNTER — OFFICE VISIT (OUTPATIENT)
Dept: ENDOCRINOLOGY | Facility: CLINIC | Age: 24
End: 2024-07-22
Payer: COMMERCIAL

## 2024-07-22 VITALS
BODY MASS INDEX: 28.16 KG/M2 | WEIGHT: 153 LBS | OXYGEN SATURATION: 99 % | HEIGHT: 62 IN | SYSTOLIC BLOOD PRESSURE: 108 MMHG | HEART RATE: 85 BPM | RESPIRATION RATE: 18 BRPM | DIASTOLIC BLOOD PRESSURE: 72 MMHG

## 2024-07-22 DIAGNOSIS — E03.8 SUBCLINICAL HYPOTHYROIDISM: Primary | ICD-10-CM

## 2024-07-22 DIAGNOSIS — E78.00 PURE HYPERCHOLESTEROLEMIA: ICD-10-CM

## 2024-07-22 DIAGNOSIS — E28.2 PCOS (POLYCYSTIC OVARIAN SYNDROME): ICD-10-CM

## 2024-07-22 PROCEDURE — 3008F BODY MASS INDEX DOCD: CPT | Performed by: INTERNAL MEDICINE

## 2024-07-22 PROCEDURE — 99214 OFFICE O/P EST MOD 30 MIN: CPT | Performed by: INTERNAL MEDICINE

## 2024-07-22 RX ORDER — LEVOTHYROXINE SODIUM 50 UG/1
50 TABLET ORAL DAILY
Qty: 90 TABLET | Refills: 1 | Status: SHIPPED | OUTPATIENT
Start: 2024-07-22 | End: 2024-11-25 | Stop reason: SDUPTHER

## 2024-07-22 RX ORDER — TIRZEPATIDE 5 MG/.5ML
5 INJECTION, SOLUTION SUBCUTANEOUS
Qty: 6 ML | Refills: 1 | Status: SHIPPED | OUTPATIENT
Start: 2024-07-22 | End: 2024-09-23

## 2024-07-22 RX ORDER — BUPROPION HYDROCHLORIDE 150 MG/1
150 TABLET ORAL DAILY
COMMUNITY
Start: 2024-06-24 | End: 2024-11-25

## 2024-07-22 RX ORDER — TIRZEPATIDE 2.5 MG/.5ML
2.5 INJECTION, SOLUTION SUBCUTANEOUS
Qty: 2 ML | Refills: 0 | Status: SHIPPED | OUTPATIENT
Start: 2024-07-22 | End: 2024-09-23

## 2024-07-22 NOTE — PROGRESS NOTES
"HPI  23 y.o. female with no significant PMH presenting for follow up of subclinical hypothyroidism  Referred by: PCP Robina Mitchell DO     Initial history (5/15/23):  - Established care with PCP Robina Mitchell DO 3/24/23. TSH ordered due to overweight BMI. 4/25/23 pt noted she had had elevated TSH levels looking back at her prior labs 12/2017 TSH 6.62, 1/2019 TSH 5.13, 5/2022 3.35, 4/2023 4.89. PCP referred to endocrinology at pt request.     First visit 5/15/23  Last visit 1/23/24. Increased spironolactone dose    Interval history summarized as per review of records:  - Saw PCP Robina Mitchell DO 2/2024 for longer menstrual cycles. Ordered insulin and testosterone levels.   - 5/3 PCP referred to Dr. Kinsey for second opinion for elevated testosterone  - Saw derm Dr. Vieyra 5/3 for alopecia. Recommended increased spironolactone dose.     Taking levothyroxine 50 mcg daily properly: yes. On this dose since 8/2023, started LT4 5/2023  Took T3 11/2023 for 2 weeks, stopped due to lack of asymptomatic improvement  Taking biotin: no    Fatigue: states energy level has been \"fine\". Improved since last visit. Feels like this has improved with more regular eating habits and eating more protein   Change in weight: stable. Noted to have gained a lot of weight towards end of college within 1 year. Tries to eat healthy and exercise. Frustrated that she doesn't lose weight with these habits.   Wt Readings from Last 3 Encounters:   07/22/24 69.4 kg (153 lb)   02/19/24 68.9 kg (152 lb)   01/23/24 69.9 kg (154 lb)   Heat/cold intolerance: + cold intolerance improved from prior  Palpitations: denies  + constipation and diarrhea, attributes to what she eats.   Anxiety: yes and depression, ongoing for long time, without recent changes. Admits to being worried about possible PCOS diagnosis  Changes in skin/hair/nails: + hair thinning for past 2 years, worse since last visit, as below  Sleep disturbances/insomnia: improved since " "last visit. Also started taking magnesium at night    Menstrual history: regular menses in general but had one cycle that was 48 days between 7-9/2023, then were regular again after that. Now cycle length is more variable - 22 days 2024, 26 days 3/2024, 34 days 4/2024, then after spironolactone dose increased 5/2024 had bleeding q2 weeks but then this past month had a 30 day cycle.  Stopped Vestura OCP 3/2023 bc didn't want to take any more, previously took since 13 yo. Doesn't want to go back to OCP.   Pregnancy history: never  Sexually active: yes, using contraception    Dysphagia/odynophagia: denies  Change in neck size/new neck masses: denies    Personal history of head/neck radiation: denies  Family history of thyroid disease or cancer: + maternal aunt, mother, GM - hypothyroidism  Family history of autoimmunity: mother - psoriasis    Was following with GYN for possible PCOS diagnosis. States it was a \"soft call\" on having PCOS based on her US. But notes she has high androgen symptoms, e.g. hair loss, hirsutism. Total testosterone was high but free testosterone was normal.   On spironolactone 150 mg daily BID. Started by GYN 1/2024, dose increased by derm 5/2024. Thinks she is seeing less hirsutism and less hair fall on higher dose but nothing dramatic     States mother is having blood sugar issues, thinks she is going to be diagnosed with DM2. Concerned about what this means for her.     Interested in weight loss medications to see if this would help with menstrual regulation     No h/o pancreatitis. No family history of thyroid cancer  -------------------------------------------------------------------------  PMH  Past Medical History:   Diagnosis Date    RAMANDEEP (generalized anxiety disorder) 7/15/2022    Gastroesophageal reflux disease without esophagitis 7/15/2022     PSH  History reviewed. No pertinent surgical history.  Social  Social History     Socioeconomic History    Marital status: Significant Other     " Spouse name: Not on file    Number of children: Not on file    Years of education: Not on file    Highest education level: Not on file   Occupational History    Not on file   Tobacco Use    Smoking status: Never    Smokeless tobacco: Never   Vaping Use    Vaping Use: Never used   Substance and Sexual Activity    Alcohol use: Yes     Alcohol/week: 2.0 standard drinks of alcohol     Types: 2 Standard drinks or equivalent per week     Comment: Social    Drug use: Never    Sexual activity: Defer   Other Topics Concern    Not on file   Social History Narrative    Single no kids female     Studying Speech Pathology      Social Determinants of Health     Financial Resource Strain: Not on file   Food Insecurity: Not on file   Transportation Needs: Not on file   Physical Activity: Not on file   Stress: Not on file   Social Connections: Not on file   Intimate Partner Violence: Not on file   Housing Stability: Not on file     Family hx  Family History   Problem Relation Age of Onset    Hypertension Biological Mother     No Known Problems Biological Father     Heart disease Maternal Grandmother     Heart disease Maternal Grandfather     Heart disease Paternal Grandfather      Medications    Current Outpatient Medications:     buPROPion XL (WELLBUTRIN XL) 150 mg 24 hr tablet, Take 150 mg by mouth daily., Disp: , Rfl:     levothyroxine (SYNTHROID) 50 mcg tablet, Take 1 tablet (50 mcg total) by mouth daily., Disp: 90 tablet, Rfl: 1    spironolactone (ALDACTONE) 50 mg tablet, Take 1 tablet (50 mg total) by mouth 2 (two) times a day. (Patient taking differently: Take 150 mg by mouth 2 (two) times a day.), Disp: 180 tablet, Rfl: 1    ketoconazole (NIZORAL) 2 % shampoo, See admin instr., Disp: , Rfl:    Allergies  No Known Allergies  -------------------------------------------------------------------------  ROS: All other 10 point systems reviewed and negative except as mentioned in HPI    PHYSICAL EXAM  Visit Vitals  /72 (BP  "Location: Left upper arm, Patient Position: Sitting)   Pulse 85   Resp 18   Ht 1.575 m (5' 2\")   Wt 69.4 kg (153 lb)   SpO2 99%   BMI 27.98 kg/m²     Wt Readings from Last 3 Encounters:   07/22/24 69.4 kg (153 lb)   02/19/24 68.9 kg (152 lb)   01/23/24 69.9 kg (154 lb)     Gen: well nourished, no acute distress  Eyes: no proptosis, normal conjunctiva  Neck: no visible thyromegaly  CV: regular rate   Pulm: no use of accessory muscles, on room air  Abd: non distended  Neuro: AAOx3  MSK: steady gait, no tremor of outstretched hands  Psych: normal mood, affect    LABS REVIEWED  Lab Results   Component Value Date    TSH 2.91 04/26/2024    TSH 1.82 01/12/2024    TSH 2.34 11/28/2023    T4F 1.1 01/12/2024    T4F 1.2 11/28/2023    T4F 1.0 08/08/2023    THYPEROX 8 08/08/2023       Lab Results   Component Value Date    GLUCOSE 84 07/17/2024    BUN 9 07/17/2024    CREATININE 0.67 07/17/2024    EGFR 124 07/17/2024     07/17/2024    K 4.2 07/17/2024     07/17/2024    CO2 25 07/17/2024    CALCIUM 9.7 07/17/2024      Testosterone, Total,  LC/MS/MS   Date/Time Value Ref Range Status   04/26/2024 0848 52 (H) 2 - 45 ng/dL Final     Comment:        For additional information, please refer to  [email or web address]/faq/  [email or web address]  (This link is being provided for informational/  educational purposes only.)     This test was developed and its analytical performance  characteristics have been determined by [facility] [place], [place]. It has  not been cleared or approved by the U.S. Food and Drug  Administration. This assay has been validated pursuant  to the CLIA regulations and is used for clinical  purposes.              Free Testosterone   Date/Time Value Ref Range Status   04/26/2024 0848 6.1 0.1 - 6.4 pg/mL Final     Comment:        This test was developed and its analytical performance  characteristics have been determined by [facility] " [place], [place]. It has  not been cleared or approved by the U.S. Food and Drug  Administration. This assay has been validated pursuant  to the CLIA regulations and is used for clinical  purposes.               Hemoglobin A1C   Date Value Ref Range Status   04/26/2024 5.0 <5.7 % of total Hgb Final     Comment:     For the purpose of screening for the presence of  diabetes:     <5.7%       Consistent with the absence of diabetes  5.7-6.4%    Consistent with increased risk for diabetes              (prediabetes)  > or =6.5%  Consistent with diabetes     This assay result is consistent with a decreased risk  of diabetes.     Currently, no consensus exists regarding use of  hemoglobin A1c for diagnosis of diabetes in children.     According to American Diabetes Association (ADA)  guidelines, hemoglobin A1c <7.0% represents optimal  control in non-pregnant diabetic patients. Different  metrics may apply to specific patient populations.   Standards of Medical Care in Diabetes(ADA).         04/24/2023 4.5 <5.7 % of total Hgb Final     Comment:     For the purpose of screening for the presence of  diabetes:     <5.7%       Consistent with the absence of diabetes  5.7-6.4%    Consistent with increased risk for diabetes              (prediabetes)  > or =6.5%  Consistent with diabetes     This assay result is consistent with a decreased risk  of diabetes.     Currently, no consensus exists regarding use of  hemoglobin A1c for diagnosis of diabetes in children.     According to American Diabetes Association (ADA)  guidelines, hemoglobin A1c <7.0% represents optimal  control in non-pregnant diabetic patients. Different  metrics may apply to specific patient populations.   Standards of Medical Care in Diabetes(ADA).              12/8/23 07:48  Testosterone 86 (2-45)  Free testosterone 2.2  DHEAS 131  FSH 3.4  PRL 8.3  E2 129    IMAGING  US PELVIS TRANSABDOMINAL & TRANSVAGINAL 12/22/23    Narrative  CLINICAL HISTORY:  N93.9: Abnormal uterine and vaginal bleeding, unspecified  one episode of abnormal uterine bleeding.  LMP of 12/18/2023.    COMPARISON: None    COMMENT:  Technique: Transabdominal and transvaginal pelvic ultrasound was performed.    UTERUS:  Measures  2.6 cm x  3.9 cm x  7.1 cm cm. The myometrium is homogeneous.  ENDOMETRIUM: Endometrial stripe appears normal measuring about 0.3 cm in  thickness.  Tiny amounts of internal color Doppler flow may be just physiologic.  Slight prominence of the endocervical stripe also likely physiologic.  There is no evidence of a mass/polyp in the endometrium.    RIGHT OVARY:  Measures 1.5 cm  x 1.9 cm x 2.7 cm cm. Within normal limits  containing several follicles..  LEFT OVARY:  Measures 2.1 cm x 1.8 cm x 2.8 cm  cm.  Within normal limits  containing several follicles including a 1 cm exophytic follicle.    There is no hydrosalphinx.    There is no cul-de-sac fluid.    The endometrium is better seen transvaginally.    IMPRESSION:    No findings to explain the patient's abnormal episode of uterine bleeding.    I personally reviewed the images on 1/23/24 and agree with the radiology report. The following is my interpretation:   Ovarian morphology not c/w PCOS    ASSESSMENT AND PLAN    1. Subclinical hypothyroidism  - Ongoing since at least 2017 per pt reported TSH values, no free T4 levels available prior to 4/2023. Started on LT4 5/2023   - Tried T4/T3 combination 11/2023 however pt discontinued after several weeks due no change in symptoms  - Continue to aim for TSH  < 2.5  - Continue levothyroxine 50 mcg daily. Counseled on proper administration.   - Counseled on signs/symptoms of hypo/hyperthyroidism and instructed pt to call with concerns  - Previously counseled pt on importance of thyroid hormone with regards to pregnancy  - Check TFTs prior to next visit for monitoring.     2. Idiopathic hirsutism vs PCOS  - Underwent PCOS evaluation with GYN with labs showing mildly elevated  testosterone 12/2023, elevated again 4/2024  - Was on OCP until 3/2023, stopped due to not wanting to take it anymore. Was having regular cycles after stopping OCP until ~2/2024 and since then cycle length has become more variable. Given change in menstrual regularity pt may have PCOS as this is now 2/3 [place] criteria  - Pelvic US 12/2023 without PCO morphology  - Discussed restarting OCP today but pt remains uninterested. Also discussed metformin vs GLP1. Pt prefers to try GLP1 first  - Start zepbound 2.5 mg weekly x4 weeks, then increase to 5 mg weekly. Counseled on risk of side effects including N/V, pancreatitis, and MTC. Aim for 3-5% BW loss (4.5-7.5 lb)  - Was started on spironolactone 50 mg daily by GYN 1/2024 for hirsutism, dose increased up to 150 mg BID by derm 5/2025. Continue for now, d/w pt this can take ~6 mo to lead to noticeable change  - Check BMP prior to next visit for monitoring    3. Overweight Body mass index is 27.98 kg/m².  - Trial GLP1 as above    4. HLD  - Trial GLP1 as above    RTC 3 mo

## 2024-07-22 NOTE — PATIENT INSTRUCTIONS
Start zepbound. Start with 2.5 mg once a week for 4 weeks, then increase to 5 mg once a week. Call the office if you have any nausea or vomiting after you start this medication

## 2024-07-26 ENCOUNTER — TELEPHONE (OUTPATIENT)
Dept: ENDOCRINOLOGY | Facility: CLINIC | Age: 24
End: 2024-07-26
Payer: COMMERCIAL

## 2024-07-29 ENCOUNTER — TELEPHONE (OUTPATIENT)
Dept: ENDOCRINOLOGY | Facility: CLINIC | Age: 24
End: 2024-07-29
Payer: COMMERCIAL

## 2024-07-29 NOTE — TELEPHONE ENCOUNTER
Zebound was denied, pt must try and fail other meds to get zepbound. Phentermine, Qysmia or Contrave.See media for further details if needed.     Pt is on Wellbutrin, contrave is contraindicated for pt the insurance was made aware of this.

## 2024-08-15 ENCOUNTER — TELEPHONE (OUTPATIENT)
Dept: ENDOCRINOLOGY | Facility: CLINIC | Age: 24
End: 2024-08-15
Payer: COMMERCIAL

## 2024-08-15 NOTE — TELEPHONE ENCOUNTER
Rec a Pa request for zepbound for pt. Not completed as pt made RN aware last month she was paying out of pocket and did not want to try any other medications. See notes.

## 2024-09-10 ENCOUNTER — TELEPHONE (OUTPATIENT)
Dept: ENDOCRINOLOGY | Facility: CLINIC | Age: 24
End: 2024-09-10
Payer: COMMERCIAL

## 2024-09-10 NOTE — TELEPHONE ENCOUNTER
Rec a Pa request for zepbound for pt. Not completed as pt made RN aware last month she was paying out of pocket and did not want to try any other medications. See notes.      Pt was paying out of pocket for this medication and didn't want to try another medication as per the insurances indications. PA not completed.

## 2024-09-18 ENCOUNTER — TELEPHONE (OUTPATIENT)
Dept: ENDOCRINOLOGY | Facility: CLINIC | Age: 24
End: 2024-09-18
Payer: COMMERCIAL

## 2024-09-20 DIAGNOSIS — E28.2 PCOS (POLYCYSTIC OVARIAN SYNDROME): Primary | ICD-10-CM

## 2024-09-20 RX ORDER — TIRZEPATIDE 2.5 MG/.5ML
2.5 INJECTION, SOLUTION SUBCUTANEOUS
Qty: 6 ML | Refills: 1 | Status: SHIPPED | OUTPATIENT
Start: 2024-09-20 | End: 2024-09-23

## 2024-09-23 DIAGNOSIS — E28.2 PCOS (POLYCYSTIC OVARIAN SYNDROME): ICD-10-CM

## 2024-09-23 RX ORDER — TIRZEPATIDE 2.5 MG/.5ML
2.5 INJECTION, SOLUTION SUBCUTANEOUS
Qty: 2 ML | Refills: 6 | Status: SHIPPED | OUTPATIENT
Start: 2024-09-23 | End: 2024-11-25 | Stop reason: SDUPTHER

## 2024-09-23 NOTE — TELEPHONE ENCOUNTER
Esther from [facility] 3rd party for Blazent for chanell had to wait for them to call providers office.   Needed more information and also sent with only a month supply will not provide any more then that.     Resent.

## 2024-11-25 ENCOUNTER — OFFICE VISIT (OUTPATIENT)
Dept: ENDOCRINOLOGY | Facility: CLINIC | Age: 24
End: 2024-11-25
Payer: COMMERCIAL

## 2024-11-25 VITALS
BODY MASS INDEX: 23.74 KG/M2 | HEIGHT: 62 IN | RESPIRATION RATE: 18 BRPM | SYSTOLIC BLOOD PRESSURE: 112 MMHG | DIASTOLIC BLOOD PRESSURE: 70 MMHG | OXYGEN SATURATION: 96 % | HEART RATE: 96 BPM | WEIGHT: 129 LBS

## 2024-11-25 DIAGNOSIS — E03.8 SUBCLINICAL HYPOTHYROIDISM: Primary | ICD-10-CM

## 2024-11-25 DIAGNOSIS — E78.00 PURE HYPERCHOLESTEROLEMIA: ICD-10-CM

## 2024-11-25 DIAGNOSIS — E66.3 OVERWEIGHT: ICD-10-CM

## 2024-11-25 DIAGNOSIS — E83.52 HYPERCALCEMIA: ICD-10-CM

## 2024-11-25 DIAGNOSIS — E28.2 PCOS (POLYCYSTIC OVARIAN SYNDROME): ICD-10-CM

## 2024-11-25 PROCEDURE — 99215 OFFICE O/P EST HI 40 MIN: CPT | Performed by: INTERNAL MEDICINE

## 2024-11-25 PROCEDURE — 3008F BODY MASS INDEX DOCD: CPT | Performed by: INTERNAL MEDICINE

## 2024-11-25 RX ORDER — DEXTROAMPHETAMINE SACCHARATE, AMPHETAMINE ASPARTATE, DEXTROAMPHETAMINE SULFATE AND AMPHETAMINE SULFATE 1.25; 1.25; 1.25; 1.25 MG/1; MG/1; MG/1; MG/1
5 TABLET ORAL EVERY MORNING
COMMUNITY
Start: 2024-11-12

## 2024-11-25 RX ORDER — TIRZEPATIDE 2.5 MG/.5ML
INJECTION, SOLUTION SUBCUTANEOUS
Qty: 6 ML | Refills: 1 | Status: SHIPPED | OUTPATIENT
Start: 2024-11-25 | End: 2024-12-03 | Stop reason: DRUGHIGH

## 2024-11-25 RX ORDER — LEVOTHYROXINE SODIUM 75 UG/1
75 TABLET ORAL DAILY
Qty: 90 TABLET | Refills: 1 | Status: SHIPPED | OUTPATIENT
Start: 2024-11-25 | End: 2025-05-24

## 2024-11-25 NOTE — PROGRESS NOTES
HPI  24 y.o. female with no significant PMH presenting for follow up of subclinical hypothyroidism  Referred by: PCP Robina Mitchell DO     Initial history (5/15/23):  - Established care with PCP Robina Mitchell DO 3/24/23. TSH ordered due to overweight BMI. 4/25/23 pt noted she had had elevated TSH levels looking back at her prior labs 12/2017 TSH 6.62, 1/2019 TSH 5.13, 5/2022 3.35, 4/2023 4.89. PCP referred to endocrinology at pt request.     First visit 5/15/23  Last visit 7/22/24. Started zepbound    Interval history summarized as per review of records:  - Saw derm Dr. Vieyra 9/23 in follow up. Referred to endo at [facility]. Noted pt declined restarted OCP. Decreased spironolactone due to orthostatic hypotension    Was feeling ok until about a month ago. Feeling more fatigue. Notes she has been taking iron supplement within 20-30 mins of taking LT4 for past 2-3 weeks. After labs came back switched iron administration to HS. Not sure if feels better since changing since has only been a couple of days    Saw [facility] last month. Recommended restarting OCP. Pt still debating if she should go back to the OCP. But wants to figure out the cause of her symptoms first. Requesting E2, LH, FSH levels.     Taking levothyroxine 50 mcg daily properly: as above. On this dose since 8/2023, started LT4 5/2023  Took T3 11/2023 for 2 weeks, stopped due to lack of symptomatic improvement  Missed doses: denies  Recent illness: denies  Taking biotin: has been taking Nutrafol and did not stop for labs. Was also taking this within 20-30 mins of LT4 dose.     Fatigue: as above  Change in weight:   Started zepbound 7/2024 at 153 lb. Had vomiting on 5 mg dose 9/2024, decreased back to 2.5 mg weekly. Lost 24 lb (~16% BW) since last visit/starting zepbound. Tolerating this dose. States she tried taking 5 mg last week and tolerated it. Feels like effectiveness has been wearing off the last couple of weeks.   Wt Readings from Last  "3 Encounters:   11/25/24 58.5 kg (129 lb)   07/22/24 69.4 kg (153 lb)   02/19/24 68.9 kg (152 lb)   Heat/cold intolerance: + cold intolerance, no change from prior  Palpitations: denies  Constipation and diarrhea resolved  Changes in skin/hair/nails: + hair thinning for past 2 years, worse since last visit, as below    Menstrual history: regular menses in general but had one cycle that was 48 days between 7-9/2023, then were regular again after that. Now cycle length is more variable - 22 days 2024, 26 days 3/2024, 34 days 4/2024, then after spironolactone dose increased 5/2024 had bleeding q2 weeks but then 7/2024 had a 30 day cycle. Since last visit has been bleeding twice a month. But since lowering spironolactone had 22 day cycle that feels like regular period, LMP started yesterday.     Stopped Vestura OCP 3/2023 bc didn't want to take any more, previously took since 15 yo. Doesn't want to go back to OCP.   Pregnancy history: never  Sexually active: yes, using contraception    Dysphagia/odynophagia: denies  Change in neck size/new neck masses: denies    Personal history of head/neck radiation: denies  Family history of thyroid disease or cancer: + maternal aunt, mother, GM - hypothyroidism  Family history of autoimmunity: mother - psoriasis    Was following with GYN for possible PCOS diagnosis. States it was a \"soft call\" on having PCOS based on her US. But notes she has high androgen symptoms, e.g. hair loss, hirsutism. Total testosterone was high but free testosterone was normal.     Taking spironolactone 50 mg BID. Started by GYN 1/2024, dose increased by derm 5/2024, decreased again 9/2024 due to orthostatic hypotension.  Notes increased shedding. Not sure if it's related to spironolactone decrease. Derm told her it shouldn't have affected hair shedding that quickly.     No h/o pancreatitis. No family history of thyroid " cancer  -------------------------------------------------------------------------  [facility]  Past Medical History:   Diagnosis Date    RAMANDEEP (generalized anxiety disorder) 7/15/2022    Gastroesophageal reflux disease without esophagitis 7/15/2022     PS  No past surgical history on file.  Social  Social History     Socioeconomic History    Marital status:      Spouse name: Not on file    Number of children: Not on file    Years of education: Not on file    Highest education level: Not on file   Occupational History    Not on file   Tobacco Use    Smoking status: Never    Smokeless tobacco: Never   Vaping Use    Vaping status: Never Used   Substance and Sexual Activity    Alcohol use: Yes     Alcohol/week: 2.0 standard drinks of alcohol     Types: 2 Standard drinks or equivalent per week     Comment: Social    Drug use: Never    Sexual activity: Defer   Other Topics Concern    Not on file   Social History Narrative    Single no kids female     Studying Speech Pathology      Social Drivers of Health     Financial Resource Strain: Not on file   Food Insecurity: Not on file   Transportation Needs: Not on file   Physical Activity: Not on file   Stress: Not on file   Social Connections: Not on file   Intimate Partner Violence: Not on file   Housing Stability: Not on file     Family hx  Family History   Problem Relation Name Age of Onset    Hypertension Biological Mother Oralia     No Known Problems Biological Father Al     Heart disease Maternal Grandmother      Heart disease Maternal Grandfather      Heart disease Paternal Grandfather       Medications    Current Outpatient Medications:     dextroamphetamine-amphetamine (ADDERALL) 5 mg tablet, Take 5 mg by mouth every morning., Disp: , Rfl:     levothyroxine (SYNTHROID) 50 mcg tablet, Take 1 tablet (50 mcg total) by mouth daily., Disp: 90 tablet, Rfl: 1    spironolactone (ALDACTONE) 50 mg tablet, Take 1 tablet (50 mg total) by mouth 2 (two) times a day., Disp: 180 tablet,  "Rfl: 1    tirzepatide, weight loss, (ZEPBOUND) 2.5 mg/0.5 mL solution VIAL for injection, Inject 0.5 mL (2.5 mg total) under the skin every (seven) 7 days. Dx code E66.3 obesity, BMI 27.98 overweight, comorbidities PCOS., Disp: 2 mL, Rfl: 6    buPROPion XL (WELLBUTRIN XL) 150 mg 24 hr tablet, Take 150 mg by mouth daily. (Patient not taking: Reported on 11/25/2024), Disp: , Rfl:     ketoconazole (NIZORAL) 2 % shampoo, See admin instr., Disp: , Rfl:    Allergies  No Known Allergies  -------------------------------------------------------------------------  ROS: All other 10 point systems reviewed and negative except as mentioned in HPI    PHYSICAL EXAM  Visit Vitals  /70 (BP Location: Right upper arm, Patient Position: Sitting)   Pulse 96   Resp 18   Ht 1.575 m (5' 2\")   Wt 58.5 kg (129 lb)   SpO2 96%   BMI 23.59 kg/m²       Wt Readings from Last 3 Encounters:   11/25/24 58.5 kg (129 lb)   07/22/24 69.4 kg (153 lb)   02/19/24 68.9 kg (152 lb)     Gen: well nourished, no acute distress  Eyes: no proptosis, normal conjunctiva  Neck: no visible thyromegaly  CV: regular rate   Pulm: no use of accessory muscles, on room air  Abd: non distended  Neuro: AAOx3  MSK: steady gait, no tremor of outstretched hands  Psych: normal mood, affect    LABS REVIEWED  Lab Results   Component Value Date    TSH 5.13 (H) 11/20/2024    TSH 2.91 04/26/2024    TSH 1.82 01/12/2024    T4F 1.2 11/20/2024    T4F 1.1 01/12/2024    T4F 1.2 11/28/2023    THYPEROX 8 08/08/2023     Lab Results   Component Value Date    GLUCOSE 72 11/20/2024    BUN 11 11/20/2024    CREATININE 0.78 11/20/2024    EGFR 107 11/20/2024     11/20/2024    K 4.3 11/20/2024     11/20/2024    CO2 25 11/20/2024    CALCIUM 10.3 (H) 11/20/2024    ALBUMIN 4.8 04/26/2024    BILITOT 0.4 04/26/2024    ALKPHOS 70 04/26/2024    ALT 23 04/26/2024    AST 21 04/26/2024       Testosterone, Total,  LC/MS/MS   Date/Time Value Ref Range Status   04/26/2024 0848 52 (H) 2 - 45 " ng/dL Final     Comment:        For additional information, please refer to  [email or web address]/faq/  [email or web address]  (This link is being provided for informational/  educational purposes only.)     This test was developed and its analytical performance  characteristics have been determined by [employer] [place], [place]. It has  not been cleared or approved by the U.S. Food and Drug  Administration. This assay has been validated pursuant  to the CLIA regulations and is used for clinical  purposes.              Free Testosterone   Date/Time Value Ref Range Status   04/26/2024 0848 6.1 0.1 - 6.4 pg/mL Final     Comment:        This test was developed and its analytical performance  characteristics have been determined by [facility] [place]. It has  not been cleared or approved by the U.S. Food and Drug  Administration. This assay has been validated pursuant  to the CLIA regulations and is used for clinical  purposes.               Hemoglobin A1C   Date Value Ref Range Status   04/26/2024 5.0 <5.7 % of total Hgb Final     Comment:     For the purpose of screening for the presence of  diabetes:     <5.7%       Consistent with the absence of diabetes  5.7-6.4%    Consistent with increased risk for diabetes              (prediabetes)  > or =6.5%  Consistent with diabetes     This assay result is consistent with a decreased risk  of diabetes.     Currently, no consensus exists regarding use of  hemoglobin A1c for diagnosis of diabetes in children.     According to American Diabetes Association (ADA)  guidelines, hemoglobin A1c <7.0% represents optimal  control in non-pregnant diabetic patients. Different  metrics may apply to specific patient populations.   Standards of Medical Care in Diabetes(ADA).         04/24/2023 4.5 <5.7 % of total Hgb Final     Comment:     For the purpose of screening for the presence of  diabetes:     <5.7%        Consistent with the absence of diabetes  5.7-6.4%    Consistent with increased risk for diabetes              (prediabetes)  > or =6.5%  Consistent with diabetes     This assay result is consistent with a decreased risk  of diabetes.     Currently, no consensus exists regarding use of  hemoglobin A1c for diagnosis of diabetes in children.     According to American Diabetes Association (ADA)  guidelines, hemoglobin A1c <7.0% represents optimal  control in non-pregnant diabetic patients. Different  metrics may apply to specific patient populations.   Standards of Medical Care in Diabetes(ADA).              12/8/23 07:48  Testosterone 86 (2-45)  Free testosterone 2.2  DHEAS 131  FSH 3.4  PRL 8.3  E2 129    IMAGING  US PELVIS TRANSABDOMINAL & TRANSVAGINAL 12/22/23    Narrative  CLINICAL HISTORY: N93.9: Abnormal uterine and vaginal bleeding, unspecified  one episode of abnormal uterine bleeding.  LMP of 12/18/2023.    COMPARISON: None    COMMENT:  Technique: Transabdominal and transvaginal pelvic ultrasound was performed.    UTERUS:  Measures  2.6 cm x  3.9 cm x  7.1 cm cm. The myometrium is homogeneous.  ENDOMETRIUM: Endometrial stripe appears normal measuring about 0.3 cm in  thickness.  Tiny amounts of internal color Doppler flow may be just physiologic.  Slight prominence of the endocervical stripe also likely physiologic.  There is no evidence of a mass/polyp in the endometrium.    RIGHT OVARY:  Measures 1.5 cm  x 1.9 cm x 2.7 cm cm. Within normal limits  containing several follicles..  LEFT OVARY:  Measures 2.1 cm x 1.8 cm x 2.8 cm  cm.  Within normal limits  containing several follicles including a 1 cm exophytic follicle.    There is no hydrosalphinx.    There is no cul-de-sac fluid.    The endometrium is better seen transvaginally.    IMPRESSION:    No findings to explain the patient's abnormal episode of uterine bleeding.    I personally reviewed the images on 1/23/24 and agree with the radiology report.  The following is my interpretation:   Ovarian morphology not c/w PCOS    ASSESSMENT AND PLAN    1. Subclinical hypothyroidism  - Ongoing since at least 2017 per pt reported TSH values, no free T4 levels available prior to 4/2023. Started on LT4 5/2023   - Tried T4/T3 combination 11/2023 however pt discontinued after several weeks due no change in symptoms  - Continue to aim for TSH  < 2.5  - Most recent TSH up to 5. She states she accidentally started taking simultaneously with iron supplement and Nutrafol but was c/o recurrent fatigue even prior to that. Since last labs has gone back to taking LT4 alone, but does not notice an improvement in fatigue. Discussed repeating labs in 4-6 weeks vs increasing dose and pt opted to increase dose  - Increase levothyroxine to 75 mcg daily. Counseled on proper administration.   - Counseled on signs/symptoms of hypo/hyperthyroidism and instructed pt to call with concerns  - Previously counseled pt on importance of thyroid hormone with regards to pregnancy  - Check TFTs prior to next visit for monitoring.     2. Idiopathic hirsutism vs PCOS  - Underwent PCOS evaluation with GYN with labs showing mildly elevated testosterone 12/2023, elevated again 4/2024  - Was on OCP until 3/2023, stopped due to not wanting to take it anymore. Was having regular cycles after stopping OCP until ~2/2024 and since then cycle length has become more variable. Given change in menstrual regularity pt may have PCOS as this is now 2/3 [place] criteria. Pt notes her last cycle was more regular  - Pelvic US 12/2023 without PCO morphology  - Started on GLP 7/2024 and has lost 24 lb/16% BW on subtherapeutic dose, however she notes effectiveness is waning. Increase to 5 mg q10 days at pt request. Of note pt is paying out of pocket and not sure how long she will want to continue doing so  - Discussed restarting OCP again today but pt remains uninterested.   - Discussed metformin again today but given normal  glucose metabolism do not thing this will add significant benefit in combination with zepbound  - Was started on spironolactone 50 mg daily by GYN 1/2024 for hirsutism, dose increased up to 150 mg daily by derm 5/2024, but decreased back to 50 mg BID 9/2024 for orthostatic hypotension. Continue for now, d/w pt this can take ~6 mo to lead to noticeable change  - Check BMP prior to next visit for monitoring  - Will try to add on E2, LH, FSH to most recent labs at pt request    3. Overweight Body mass index is 23.59 kg/m².  - BMI now normal on GLP    4. HLD  - Trial GLP1 as above    5. Hypercalcemia  - Ca 10.3, typically normal. Suspect lab error  - Check repeat CMP w/ PTH, 25 vitamin D, and phos prior to next visit for monitoring     RTC 3 mo  Total time spent on the day of the visit, including record review, face to face time, and documentation: 42 min

## 2024-11-26 ENCOUNTER — TELEPHONE (OUTPATIENT)
Dept: ENDOCRINOLOGY | Facility: CLINIC | Age: 24
End: 2024-11-26
Payer: COMMERCIAL

## 2024-11-26 LAB
BUN SERPL-MCNC: 11 MG/DL (ref 7–25)
BUN/CREAT SERPL: ABNORMAL (CALC) (ref 6–22)
CALCIUM SERPL-MCNC: 10.3 MG/DL (ref 8.6–10.2)
CHLORIDE SERPL-SCNC: 101 MMOL/L (ref 98–110)
CO2 SERPL-SCNC: 25 MMOL/L (ref 20–32)
CREAT SERPL-MCNC: 0.78 MG/DL (ref 0.5–0.96)
EGFRCR SERPLBLD CKD-EPI 2021: 107 ML/MIN/1.73M2
ESTRADIOL SERPL-MCNC: 661 PG/ML
FSH SERPL-ACNC: 2.3 MIU/ML
GLUCOSE SERPL-MCNC: 72 MG/DL (ref 65–99)
LH SERPL-ACNC: 10.2 MIU/ML
POTASSIUM SERPL-SCNC: 4.3 MMOL/L (ref 3.5–5.3)
QUEST COMMENT: NORMAL
QUEST CONTACT:: NORMAL
QUEST REPORT ALWAYS MESSAGE DEMOGRAPHICS IN QUESTION: NORMAL
REF LAB TEST NAME: NORMAL
REF LAB TEST: NORMAL
SODIUM SERPL-SCNC: 136 MMOL/L (ref 135–146)
T4 FREE SERPL-MCNC: 1.2 NG/DL (ref 0.8–1.8)
TSH SERPL-ACNC: 5.13 MIU/L

## 2024-11-26 NOTE — TELEPHONE ENCOUNTER
Contacted [facility] in regards to test add on per providers request. Spoke with Tamara who was able to add on estradiol, LH and FSH to labs.

## 2024-11-30 DIAGNOSIS — R79.89 HIGH SERUM ESTRADIOL: Primary | ICD-10-CM

## 2024-11-30 NOTE — RESULT ENCOUNTER NOTE
E2 elevated with low normal FSH and normal LH. Unclear significance of elevated E2, will monitor with next labs and ask pt to have drawn on cycle day 3.

## 2024-12-10 ENCOUNTER — TELEPHONE (OUTPATIENT)
Dept: ENDOCRINOLOGY | Facility: CLINIC | Age: 24
End: 2024-12-10
Payer: COMMERCIAL

## 2024-12-10 NOTE — TELEPHONE ENCOUNTER
Rain from the [facility] called stating that they received a prescription yesterday for the Zepbound and it has been rejected due to no dx code attached. In September a prescription was submitted with a dx code and it was denied due to the dx code that was submitted was incorrect.     Please submit a new prescription with an accurate dx code which it needs to reflect that pt is obese or over weight.    Since the BMI is indicated as 27.98 which is low it may require a secondary dx to the script.    Please advise

## 2025-01-16 RX ORDER — SPIRONOLACTONE 50 MG/1
50 TABLET, FILM COATED ORAL 2 TIMES DAILY
Qty: 180 TABLET | Refills: 1 | Status: SHIPPED | OUTPATIENT
Start: 2025-01-16

## 2025-03-07 RX ORDER — TIRZEPATIDE 5 MG/.5ML
INJECTION, SOLUTION SUBCUTANEOUS
Qty: 2 ML | Refills: 0 | Status: SHIPPED | OUTPATIENT
Start: 2025-03-07

## 2025-03-10 ENCOUNTER — TELEPHONE (OUTPATIENT)
Dept: ENDOCRINOLOGY | Facility: CLINIC | Age: 25
End: 2025-03-10
Payer: COMMERCIAL

## 2025-03-10 NOTE — TELEPHONE ENCOUNTER
Spoke with patient reminding of labs for upcoming appointment. Patient states she completed the labs this morning.

## 2025-03-11 LAB
25(OH)D3+25(OH)D2 SERPL-MCNC: 105 NG/ML (ref 30–100)
ALBUMIN SERPL-MCNC: 5 G/DL (ref 3.6–5.1)
ALBUMIN/GLOB SERPL: 2.3 (CALC) (ref 1–2.5)
ALP SERPL-CCNC: 46 U/L (ref 31–125)
ALT SERPL-CCNC: 17 U/L (ref 6–29)
AST SERPL-CCNC: 18 U/L (ref 10–30)
BILIRUB SERPL-MCNC: 0.9 MG/DL (ref 0.2–1.2)
BUN SERPL-MCNC: 14 MG/DL (ref 7–25)
BUN/CREAT SERPL: NORMAL (CALC) (ref 6–22)
CALCIUM SERPL-MCNC: 10 MG/DL (ref 8.6–10.2)
CHLORIDE SERPL-SCNC: 100 MMOL/L (ref 98–110)
CO2 SERPL-SCNC: 28 MMOL/L (ref 20–32)
CREAT SERPL-MCNC: 0.68 MG/DL (ref 0.5–0.96)
EGFRCR SERPLBLD CKD-EPI 2021: 123 ML/MIN/1.73M2
GLOBULIN SER CALC-MCNC: 2.2 G/DL (CALC) (ref 1.9–3.7)
GLUCOSE SERPL-MCNC: 72 MG/DL (ref 65–99)
PHOSPHATE SERPL-MCNC: 4 MG/DL (ref 2.5–4.5)
POTASSIUM SERPL-SCNC: 4.2 MMOL/L (ref 3.5–5.3)
PROT SERPL-MCNC: 7.2 G/DL (ref 6.1–8.1)
PTH-INTACT SERPL-MCNC: 44 PG/ML (ref 16–77)
SODIUM SERPL-SCNC: 137 MMOL/L (ref 135–146)
T4 FREE SERPL-MCNC: 1.7 NG/DL (ref 0.8–1.8)
TSH SERPL-ACNC: 1.87 MIU/L

## 2025-03-13 ENCOUNTER — OFFICE VISIT (OUTPATIENT)
Dept: ENDOCRINOLOGY | Facility: CLINIC | Age: 25
End: 2025-03-13
Payer: COMMERCIAL

## 2025-03-13 VITALS
OXYGEN SATURATION: 97 % | HEIGHT: 62 IN | SYSTOLIC BLOOD PRESSURE: 100 MMHG | HEART RATE: 87 BPM | RESPIRATION RATE: 18 BRPM | WEIGHT: 124.6 LBS | BODY MASS INDEX: 22.93 KG/M2 | DIASTOLIC BLOOD PRESSURE: 70 MMHG

## 2025-03-13 DIAGNOSIS — E28.2 PCOS (POLYCYSTIC OVARIAN SYNDROME): ICD-10-CM

## 2025-03-13 DIAGNOSIS — R79.89 HIGH SERUM ESTRADIOL: ICD-10-CM

## 2025-03-13 DIAGNOSIS — E66.3 OVERWEIGHT: ICD-10-CM

## 2025-03-13 DIAGNOSIS — R79.89 HIGH SERUM VITAMIN D: ICD-10-CM

## 2025-03-13 DIAGNOSIS — E78.00 PURE HYPERCHOLESTEROLEMIA: ICD-10-CM

## 2025-03-13 DIAGNOSIS — E03.8 SUBCLINICAL HYPOTHYROIDISM: Primary | ICD-10-CM

## 2025-03-13 PROCEDURE — 3008F BODY MASS INDEX DOCD: CPT | Performed by: INTERNAL MEDICINE

## 2025-03-13 PROCEDURE — 99214 OFFICE O/P EST MOD 30 MIN: CPT | Performed by: INTERNAL MEDICINE

## 2025-03-13 NOTE — PROGRESS NOTES
HPI  24 y.o. female with no significant PMH presenting for follow up of subclinical hypothyroidism  Referred by: PCP Robina Mitchell DO     Initial history (5/15/23):  - Established care with PCP Dr. Mitchell 3/24/23. TSH ordered due to overweight BMI. 4/25/23 pt noted she had had elevated TSH levels looking back at her prior labs 12/2017 TSH 6.62, 1/2019 TSH 5.13, 5/2022 3.35, 4/2023 4.89. PCP referred to endocrinology at pt request.     First visit 5/15/23  Last visit 11/25/24. Increased LT4 dose, increased zepbdoun dose    Interval history summarized as per review of records:  - Saw GYN Dr. Hedrick 12/10 for annual visit. Noted cycles last 35-42 days. Recommended repeating labs and pelvic US. Repeat E2 was normal  - Saw derm Dr. Ling 1/31 for hair loss. Felt to have telogen effluvium related to weight loss. Labs ordered. Started ketoconazole shampoo for seborrheic dermatitis    Denies fatigue today, overall feels good    Taking levothyroxine 75 mcg daily properly: as above. On this dose since 11/25/24, started LT4 5/2023  Took T3 11/2023 for 2 weeks, stopped due to lack of symptomatic improvement    Taking biotin: Nutrafol, stopped for 3 days prior to albs    Fatigue: as above  Change in weight:   Started zepbound 7/2024 at 153 lb. Had vomiting on 5 mg dose 9/2024, decreased back to 2.5 mg weekly. Increased to 5 mg q10 days 11/25/24 -> has been taking 2.5 mg every 10 days. Feels like this works for her. Felt like 5 mg dose was too much  Lost another 5 lb (~4% BW) since last visit, total 29 lb/~19% BW  since starting zepbound 11/25/24 (153 lb at initiation). Tolerating this dose. States she tried taking 5 mg last week and tolerated it. Feels like effectiveness has been wearing off the last couple of weeks.     Wt Readings from Last 3 Encounters:   03/13/25 56.5 kg (124 lb 9.6 oz)   11/25/24 58.5 kg (129 lb)   07/22/24 69.4 kg (153 lb)   Heat/cold intolerance: + cold intolerance, no change from  prior  Palpitations: denies  Constipation and diarrhea resolved  Changes in skin/hair/nails: + hair thinning for past 2 years, worse since last visit, as below    Menstrual history: regular menses in general but had one cycle that was 48 days between 7-9/2023, then were regular again after that. Now cycle length is more variable - 22 days 2024, 26 days 3/2024, 34 days 4/2024, then after spironolactone dose increased 5/2024 had bleeding q2 weeks but then 7/2024 had a 30 day cycle. Since last visit cycle still q18 day cycles    Stopped Vestura OCP 3/2023 bc didn't want to take any more, previously took since 13 yo. Doesn't want to go back to OCP.   Pregnancy history: never  Sexually active: yes, using contraception    Dysphagia/odynophagia: denies  Change in neck size/new neck masses: denies    Personal history of head/neck radiation: denies  Family history of thyroid disease or cancer: + maternal aunt, mother, GM - hypothyroidism  Family history of autoimmunity: mother - psoriasis    Taking spironolactone 50 mg BID. Started by GYN 1/2024, dose increased by derm 5/2024, decreased again 9/2024 due to orthostatic hypotension. Tolerating well    Vitamin D supplement: was taking OTC D3, doesn't know dose, as well as in Nutrafol    No h/o pancreatitis. No family history of thyroid cancer  -------------------------------------------------------------------------  PMH  Past Medical History:   Diagnosis Date    RAMANDEEP (generalized anxiety disorder) 7/15/2022    Gastroesophageal reflux disease without esophagitis 7/15/2022     PS  No past surgical history on file.  Social  Social History     Socioeconomic History    Marital status:      Spouse name: Not on file    Number of children: Not on file    Years of education: Not on file    Highest education level: Not on file   Occupational History    Not on file   Tobacco Use    Smoking status: Never    Smokeless tobacco: Never   Vaping Use    Vaping status: Never Used  "  Substance and Sexual Activity    Alcohol use: Yes     Alcohol/week: 2.0 standard drinks of alcohol     Types: 2 Standard drinks or equivalent per week     Comment: Social    Drug use: Never    Sexual activity: Defer   Other Topics Concern    Not on file   Social History Narrative    Single no kids female     Studying Speech Pathology      Social Drivers of Health     Financial Resource Strain: Not on file   Food Insecurity: Not on file   Transportation Needs: Not on file   Physical Activity: Not on file   Stress: Not on file   Social Connections: Not on file   Intimate Partner Violence: Not on file   Housing Stability: Not on file     Family hx  Family History   Problem Relation Name Age of Onset    Hypertension Biological Mother Oralia     No Known Problems Biological Father Al     Heart disease Maternal Grandmother      Heart disease Maternal Grandfather      Heart disease Paternal Grandfather       Medications    Current Outpatient Medications:     dextroamphetamine-amphetamine (ADDERALL) 5 mg tablet, Take 5 mg by mouth every morning., Disp: , Rfl:     levothyroxine (SYNTHROID) 75 mcg tablet, Take 1 tablet (75 mcg total) by mouth daily., Disp: 90 tablet, Rfl: 1    spironolactone (ALDACTONE) 50 mg tablet, TAKE 1 TABLET BY MOUTH TWICE A DAY, Disp: 180 tablet, Rfl: 1    ZEPBOUND 5 mg/0.5 mL solution VIAL for injection, INJECT 0.5 ML (5 MG) UNDER THE SKIN ONCE WEEKLY (0.5ML= 50 UNITS), Disp: 2 mL, Rfl: 0   Allergies  No Known Allergies  -------------------------------------------------------------------------  ROS: All other 10 point systems reviewed and negative except as mentioned in HPI    PHYSICAL EXAM  Visit Vitals  /70 (BP Location: Right upper arm, Patient Position: Sitting)   Pulse 87   Resp 18   Ht 1.575 m (5' 2\")   Wt 56.5 kg (124 lb 9.6 oz)   SpO2 97%   BMI 22.79 kg/m²         Wt Readings from Last 3 Encounters:   03/13/25 56.5 kg (124 lb 9.6 oz)   11/25/24 58.5 kg (129 lb)   07/22/24 69.4 kg (153 lb) "     Gen: well nourished, no acute distress  Eyes: no proptosis, normal conjunctiva  Neck: no visible thyromegaly  CV: regular rate   Pulm: no use of accessory muscles, on room air  Abd: non distended  Neuro: AAOx3  MSK: steady gait, no tremor of outstretched hands  Psych: normal mood, affect    LABS REVIEWED  Lab Results   Component Value Date    TSH 1.87 03/10/2025    TSH 5.13 (H) 11/20/2024    TSH 2.91 04/26/2024    T4F 1.7 03/10/2025    T4F 1.2 11/20/2024    T4F 1.1 01/12/2024    THYPEROX 8 08/08/2023     Lab Results   Component Value Date    GLUCOSE 72 03/10/2025    BUN 14 03/10/2025    CREATININE 0.68 03/10/2025    EGFR 123 03/10/2025     03/10/2025    K 4.2 03/10/2025     03/10/2025    CO2 28 03/10/2025    CALCIUM 10.0 03/10/2025    ALBUMIN 5.0 03/10/2025    BILITOT 0.9 03/10/2025    ALKPHOS 46 03/10/2025    ALT 17 03/10/2025    AST 18 03/10/2025       Parathyroid Hormone, Intact   Date Value Ref Range Status   03/10/2025 44 16 - 77 pg/mL Final     Comment:        Interpretive Guide    Intact PTH           Calcium  ------------------    ----------           -------  Normal Parathyroid    Normal               Normal  Hypoparathyroidism    Low or Low Normal    Low  Hyperparathyroidism     Primary            Normal or High       High     Secondary          High                 Normal or Low     Tertiary           High                 High  Non-Parathyroid     Hypercalcemia      Low or Low Normal    High          Phosphate (As Phosphorus)   Date Value Ref Range Status   03/10/2025 4.0 2.5 - 4.5 mg/dL Final     Lab Results   Component Value Date    [id number] 105 (H) 03/10/2025      Testosterone, Total,  LC/MS/MS   Date/Time Value Ref Range Status   04/26/2024 0848 52 (H) 2 - 45 ng/dL Final     Comment:        For additional information, please refer to  http://education.[employer]/faq/  [email or web address]  (This link is being provided for informational/  educational purposes  only.)     This test was developed and its analytical performance  characteristics have been determined by [facility] [place], [place]. It has  not been cleared or approved by the U.S. Food and Drug  Administration. This assay has been validated pursuant  to the CLIA regulations and is used for clinical  purposes.              Free Testosterone   Date/Time Value Ref Range Status   04/26/2024 0848 6.1 0.1 - 6.4 pg/mL Final     Comment:        This test was developed and its analytical performance  characteristics have been determined by [facility] [place], [place]. It has  not been cleared or approved by the U.S. Food and Drug  Administration. This assay has been validated pursuant  to the CLIA regulations and is used for clinical  purposes.               Hemoglobin A1C   Date Value Ref Range Status   04/26/2024 5.0 <5.7 % of total Hgb Final     Comment:     For the purpose of screening for the presence of  diabetes:     <5.7%       Consistent with the absence of diabetes  5.7-6.4%    Consistent with increased risk for diabetes              (prediabetes)  > or =6.5%  Consistent with diabetes     This assay result is consistent with a decreased risk  of diabetes.     Currently, no consensus exists regarding use of  hemoglobin A1c for diagnosis of diabetes in children.     According to American Diabetes Association (ADA)  guidelines, hemoglobin A1c <7.0% represents optimal  control in non-pregnant diabetic patients. Different  metrics may apply to specific patient populations.   Standards of Medical Care in Diabetes(ADA).         04/24/2023 4.5 <5.7 % of total Hgb Final     Comment:     For the purpose of screening for the presence of  diabetes:     <5.7%       Consistent with the absence of diabetes  5.7-6.4%    Consistent with increased risk for diabetes              (prediabetes)  > or =6.5%  Consistent with diabetes     This assay result is consistent with a decreased  risk  of diabetes.     Currently, no consensus exists regarding use of  hemoglobin A1c for diagnosis of diabetes in children.     According to American Diabetes Association (ADA)  guidelines, hemoglobin A1c <7.0% represents optimal  control in non-pregnant diabetic patients. Different  metrics may apply to specific patient populations.   Standards of Medical Care in Diabetes(ADA).            Lab Results   Component Value Date    CHOL 231 (H) 04/26/2024    TRIG 70 04/26/2024    HDL 74 04/26/2024    LDLCALC 141 (H) 04/26/2024                      Estradiol   Date/Time Value Ref Range Status   11/20/2024 0818 661 (H) pg/mL Final     Comment:           Reference Range          Follicular Phase:              Mid-Cycle:                     Luteal Phase:                  Postmenopausal:      < or = 31            Reference range established on post-pubertal patient  population. No pre-pubertal reference range  established using this assay. For any patients for  whom low Estradiol levels are anticipated (e.g. males,  pre-pubertal children and hypogonadal/post-menopausal   females), the [employer] [facility]  Estradiol, Ultrasensitive, LCMSMS assay is recommended  (order code 25729).      Please note: patients being treated with the drug   fulvestrant (Faslodex(R)) have demonstrated significant   interference in immunoassay methods for estradiol   measurement. The cross reactivity could lead to falsely   elevated estradiol test results leading to an   inappropriate clinical assessment of estrogen status.  [employer] order code 30289-Estradiol,   Ultrasensitive LC/MS/MS demonstrates negligible cross   reactivity with fulvestrant.       Lh   Date/Time Value Ref Range Status   11/20/2024 0818 10.2 mIU/mL Final     Comment:         Reference Range  Follicular Phase  1.9-12.5  Mid-Cycle Peak    8.7-76.3  Luteal Phase      0.5-16.9  Postmenopausal    10.0-54.7       Fsh   Date/Time Value Ref  Range Status   11/20/2024 0818 2.3 mIU/mL Final     Comment:                         Reference Range                        Follicular Phase       2.5-10.2               Mid-cycle Peak         3.1-17.7               Luteal Phase           1.5- 9.1               Postmenopausal       23.0-116.3                                      12/8/23 07:48  Testosterone 86 (2-45)  Free testosterone 2.2  DHEAS 131  FSH 3.4  PRL 8.3  E2 129    IMAGING  PELVIC ULTRASOUND 1/9/25    CLINICAL INFORMATION: Clinical concern for PCOS    LMP: 11/24/24    PROCEDURE: Ultrasound examination of the pelvis was performed transabdominally and transvaginally. Transvaginal examination was performed to better evaluate the adnexa. The study was performed in the ultrasound department.    COMPARISON: None.    FINDINGS:    UTERUS: Anteverted. Normal size.  Homogeneous in echotexture.  No mass.    Uterine size: 3.3 cm x 3.7 cm x 6.6 cm    ENDOMETRIUM: Unremarkable. No abnormal vascularity on color Doppler. Mild vascularity of the endometrium is likely physiologic; no discrete polyp identified.    Maximum thickness: 0.6 cm    RIGHT OVARY/ADNEXA: Greater than 20 follicles.    Ovarian size: 1.9 cm x 1.9 cm x 3.1 cm = 5.9  mL      LEFT OVARY/ADNEXA: Corpus luteum measuring 3.4 x 3.1 x 2.6 cm    Ovarian size: 3.2 cm x 3.8 cm x 3.5 cm = 22.3  mL    FLUID: There is a small amount of fluid in the cul-de-sac, likely physiologic.    Impression  Polycystic morphology of the right ovary; supportive of suspected PCOS.    Left corpus luteum.    No images available for independent review     US PELVIS TRANSABDOMINAL & TRANSVAGINAL 12/22/23    Narrative  CLINICAL HISTORY: N93.9: Abnormal uterine and vaginal bleeding, unspecified  one episode of abnormal uterine bleeding.  LMP of 12/18/2023.    COMPARISON: None    COMMENT:  Technique: Transabdominal and transvaginal pelvic ultrasound was performed.    UTERUS:  Measures  2.6 cm x  3.9 cm x  7.1 cm cm. The myometrium is  homogeneous.  ENDOMETRIUM: Endometrial stripe appears normal measuring about 0.3 cm in  thickness.  Tiny amounts of internal color Doppler flow may be just physiologic.  Slight prominence of the endocervical stripe also likely physiologic.  There is no evidence of a mass/polyp in the endometrium.    RIGHT OVARY:  Measures 1.5 cm  x 1.9 cm x 2.7 cm cm. Within normal limits  containing several follicles..  LEFT OVARY:  Measures 2.1 cm x 1.8 cm x 2.8 cm  cm.  Within normal limits  containing several follicles including a 1 cm exophytic follicle.    There is no hydrosalphinx.    There is no cul-de-sac fluid.    The endometrium is better seen transvaginally.    IMPRESSION:    No findings to explain the patient's abnormal episode of uterine bleeding.    I personally reviewed the images on 1/23/24 and agree with the radiology report. The following is my interpretation:   Ovarian morphology not c/w PCOS    ASSESSMENT AND PLAN    1. Subclinical hypothyroidism  - Ongoing since at least 2017 per pt reported TSH values, no free T4 levels available prior to 4/2023. Started on LT4 5/2023   - Tried T4/T3 combination 11/2023 however pt discontinued after several weeks due no change in symptoms  - Clinically and biochemically euthyroid with TSH  at goal < 2.5  - Continue levothyroxine 75 mcg daily. Counseled on proper administration.   - Counseled on signs/symptoms of hypo/hyperthyroidism and instructed pt to call with concerns  - Previously counseled pt on importance of thyroid hormone with regards to pregnancy  - Check TFTs prior to next visit for monitoring.     2. PCOS  - Underwent PCOS evaluation with GYN with labs showing mildly elevated testosterone 12/2023, elevated again 4/2024  - Pelvic US 12/2023 without PCO morphology but repeat 1/2025 was c/w PCOS. Pt now meets [place] criteria for PCOS regardless of menstrual regularity  - Was on OCP until 3/2023, stopped due to not wanting to take it anymore. Was having regular  cycles after stopping OCP until ~2/2024 and since then cycle length has become more variable but generally with frequent menses (q18 day cycles)  - E2 level elevated 11/2024, but suspect lab error as repeat 12/2024 was completely normal  - Started on GLP 7/2024 and has lost a total of 29 lb/19% BW on subtherapeutic dose. Did not tolerate 5 mg dose, even with q10 day dosing. Wants to continue with 2.5 mg q10 days. Discussed with pt that this is technically off label dosing and the effectiveness has not been demonstrated at this dose but since pt is feeling good she wants to continue. Continue zepbound 2.5 mg q10 days for now  - Discussed restarting OCP again today but pt remains uninterested.   - Was started on spironolactone 50 mg daily by GYN 1/2024 for hirsutism, dose increased up to 150 mg daily by derm 5/2024, but decreased back to 50 mg BID 9/2024 for orthostatic hypotension. Continue spironolactone 50 mg BID  - Check BMP annually for monitoring (next 3/2026), or sooner if dose is increased    3. Overweight Body mass index is 22.79 kg/m².  - BMI now normal on GLP    4. HLD  - Continue GLP1 as above  - Check lipid panel prior to next visit for monitoring     5. Hypercalcemia  - Resolved, was likely lab error    6. High vitamin D  - Pt was inadvertently taking two supplements with vitamin D. Pt has since discontinued one  - Check repeat 25 vitamin D prior to next visit for monitoring     RTC 3-4 mo

## 2025-04-16 RX ORDER — LEVOTHYROXINE SODIUM 75 UG/1
75 TABLET ORAL DAILY
Qty: 90 TABLET | Refills: 1 | Status: SHIPPED | OUTPATIENT
Start: 2025-04-16 | End: 2025-10-13

## 2025-04-16 RX ORDER — SPIRONOLACTONE 50 MG/1
50 TABLET, FILM COATED ORAL 2 TIMES DAILY
Qty: 180 TABLET | Refills: 1 | Status: SHIPPED | OUTPATIENT
Start: 2025-04-16

## 2025-05-23 ENCOUNTER — OFFICE VISIT (OUTPATIENT)
Dept: PRIMARY CARE | Facility: CLINIC | Age: 25
End: 2025-05-23
Payer: COMMERCIAL

## 2025-05-23 VITALS
SYSTOLIC BLOOD PRESSURE: 110 MMHG | HEIGHT: 62 IN | OXYGEN SATURATION: 99 % | HEART RATE: 83 BPM | RESPIRATION RATE: 16 BRPM | WEIGHT: 124 LBS | BODY MASS INDEX: 22.82 KG/M2 | TEMPERATURE: 97.3 F | DIASTOLIC BLOOD PRESSURE: 70 MMHG

## 2025-05-23 DIAGNOSIS — F51.01 PRIMARY INSOMNIA: ICD-10-CM

## 2025-05-23 DIAGNOSIS — Z00.00 ANNUAL PHYSICAL EXAM: Primary | ICD-10-CM

## 2025-05-23 DIAGNOSIS — N92.1 MENORRHAGIA WITH IRREGULAR CYCLE: ICD-10-CM

## 2025-05-23 PROCEDURE — 3008F BODY MASS INDEX DOCD: CPT | Performed by: STUDENT IN AN ORGANIZED HEALTH CARE EDUCATION/TRAINING PROGRAM

## 2025-05-23 PROCEDURE — 99395 PREV VISIT EST AGE 18-39: CPT | Performed by: STUDENT IN AN ORGANIZED HEALTH CARE EDUCATION/TRAINING PROGRAM

## 2025-05-23 RX ORDER — HYDROXYZINE HYDROCHLORIDE 25 MG/1
25 TABLET, FILM COATED ORAL NIGHTLY PRN
Qty: 30 TABLET | Refills: 0 | Status: SHIPPED | OUTPATIENT
Start: 2025-05-23 | End: 2025-11-19

## 2025-05-23 RX ORDER — DROSPIRENONE AND ETHINYL ESTRADIOL 0.02-3(28)
1 KIT ORAL DAILY
COMMUNITY
Start: 2025-03-10 | End: 2025-05-23

## 2025-07-28 LAB
BASOPHILS # BLD AUTO: 0 X10E3/UL (ref 0–0.2)
BASOPHILS NFR BLD AUTO: 1 %
EOSINOPHIL # BLD AUTO: 0.1 X10E3/UL (ref 0–0.4)
EOSINOPHIL NFR BLD AUTO: 3 %
ERYTHROCYTE [DISTWIDTH] IN BLOOD BY AUTOMATED COUNT: 11.7 % (ref 11.7–15.4)
HCT VFR BLD AUTO: 46.9 % (ref 34–46.6)
HGB BLD-MCNC: 15.2 G/DL (ref 11.1–15.9)
IMM GRANULOCYTES # BLD AUTO: 0 X10E3/UL (ref 0–0.1)
IMM GRANULOCYTES NFR BLD AUTO: 0 %
LYMPHOCYTES # BLD AUTO: 2.2 X10E3/UL (ref 0.7–3.1)
LYMPHOCYTES NFR BLD AUTO: 44 %
MCH RBC QN AUTO: 32.4 PG (ref 26.6–33)
MCHC RBC AUTO-ENTMCNC: 32.4 G/DL (ref 31.5–35.7)
MCV RBC AUTO: 100 FL (ref 79–97)
MONOCYTES # BLD AUTO: 0.4 X10E3/UL (ref 0.1–0.9)
MONOCYTES NFR BLD AUTO: 8 %
NEUTROPHILS # BLD AUTO: 2.3 X10E3/UL (ref 1.4–7)
NEUTROPHILS NFR BLD AUTO: 44 %
PLATELET # BLD AUTO: 267 X10E3/UL (ref 150–450)
RBC # BLD AUTO: 4.69 X10E6/UL (ref 3.77–5.28)
WBC # BLD AUTO: 5.1 X10E3/UL (ref 3.4–10.8)

## 2025-07-29 LAB
25(OH)D3+25(OH)D2 SERPL-MCNC: 50.4 NG/ML (ref 30–100)
CHOLEST SERPL-MCNC: 201 MG/DL (ref 100–199)
FERRITIN SERPL-MCNC: 125 NG/ML (ref 15–150)
HDLC SERPL-MCNC: 76 MG/DL
IRON SATN MFR SERPL: 43 % (ref 15–55)
IRON SERPL-MCNC: 142 UG/DL (ref 27–159)
LDLC SERPL CALC-MCNC: 109 MG/DL (ref 0–99)
T4 FREE SERPL-MCNC: 1.44 NG/DL (ref 0.82–1.77)
TIBC SERPL-MCNC: 330 UG/DL (ref 250–450)
TRIGL SERPL-MCNC: 91 MG/DL (ref 0–149)
TSH SERPL DL<=0.005 MIU/L-ACNC: 1.68 UIU/ML (ref 0.45–4.5)
UIBC SERPL-MCNC: 188 UG/DL (ref 131–425)
VLDLC SERPL CALC-MCNC: 16 MG/DL (ref 5–40)

## 2025-07-30 ENCOUNTER — RESULTS FOLLOW-UP (OUTPATIENT)
Dept: ENDOCRINOLOGY | Facility: CLINIC | Age: 25
End: 2025-07-30
Payer: COMMERCIAL

## 2025-07-30 NOTE — RESULT ENCOUNTER NOTE
Please confirm if pt is planning to continue seeing me, she saw another endocrinologist yesterday

## 2025-07-30 NOTE — TELEPHONE ENCOUNTER
Patient returned call and states that she is staying with us, due to her appt being in October she chose this time to get a 2nd opinion.

## 2025-07-30 NOTE — TELEPHONE ENCOUNTER
----- Message from Pearl Kong sent at 7/30/2025  2:25 PM EDT -----  Please confirm if pt is planning to continue seeing me, she saw another endocrinologist yesterday   ----- Message -----  From: Interface, [facility] Lab Results In  Sent: 7/29/2025   9:42 AM EDT  To: Pearl Kong MD

## 2025-08-15 RX ORDER — TIRZEPATIDE 5 MG/.5ML
INJECTION, SOLUTION SUBCUTANEOUS
Qty: 2 ML | Refills: 1 | Status: SHIPPED | OUTPATIENT
Start: 2025-08-15